# Patient Record
Sex: MALE | Race: WHITE | NOT HISPANIC OR LATINO | Employment: UNEMPLOYED | URBAN - METROPOLITAN AREA
[De-identification: names, ages, dates, MRNs, and addresses within clinical notes are randomized per-mention and may not be internally consistent; named-entity substitution may affect disease eponyms.]

---

## 2024-05-31 ENCOUNTER — HOSPITAL ENCOUNTER (EMERGENCY)
Facility: HOSPITAL | Age: 29
End: 2024-05-31
Attending: EMERGENCY MEDICINE

## 2024-05-31 ENCOUNTER — HOSPITAL ENCOUNTER (INPATIENT)
Facility: HOSPITAL | Age: 29
LOS: 7 days | Discharge: HOME | DRG: 751 | End: 2024-06-07
Attending: STUDENT IN AN ORGANIZED HEALTH CARE EDUCATION/TRAINING PROGRAM | Admitting: STUDENT IN AN ORGANIZED HEALTH CARE EDUCATION/TRAINING PROGRAM
Payer: COMMERCIAL

## 2024-05-31 VITALS
TEMPERATURE: 99 F | DIASTOLIC BLOOD PRESSURE: 76 MMHG | RESPIRATION RATE: 18 BRPM | WEIGHT: 210 LBS | HEIGHT: 75 IN | SYSTOLIC BLOOD PRESSURE: 128 MMHG | HEART RATE: 97 BPM | BODY MASS INDEX: 26.11 KG/M2 | OXYGEN SATURATION: 100 %

## 2024-05-31 DIAGNOSIS — F41.9 ANXIETY: Primary | ICD-10-CM

## 2024-05-31 DIAGNOSIS — F33.9 RECURRENT MAJOR DEPRESSIVE DISORDER (HCC): ICD-10-CM

## 2024-05-31 DIAGNOSIS — F10.10 ALCOHOL ABUSE: ICD-10-CM

## 2024-05-31 DIAGNOSIS — Z00.8 ENCOUNTER FOR PSYCHOLOGICAL EVALUATION: ICD-10-CM

## 2024-05-31 DIAGNOSIS — Z00.8 MEDICAL CLEARANCE FOR PSYCHIATRIC ADMISSION: ICD-10-CM

## 2024-05-31 DIAGNOSIS — F32.A DEPRESSION: ICD-10-CM

## 2024-05-31 DIAGNOSIS — R45.851 SUICIDAL IDEATION: Primary | ICD-10-CM

## 2024-05-31 LAB
AMPHETAMINES SERPL QL SCN: NEGATIVE
BARBITURATES UR QL: NEGATIVE
BENZODIAZ UR QL: NEGATIVE
COCAINE UR QL: NEGATIVE
ETHANOL EXG-MCNC: 0 MG/DL
FENTANYL UR QL SCN: NEGATIVE
HYDROCODONE UR QL SCN: NEGATIVE
METHADONE UR QL: NEGATIVE
OPIATES UR QL SCN: NEGATIVE
OXYCODONE+OXYMORPHONE UR QL SCN: NEGATIVE
PCP UR QL: NEGATIVE
THC UR QL: NEGATIVE

## 2024-05-31 PROCEDURE — 99285 EMERGENCY DEPT VISIT HI MDM: CPT | Performed by: EMERGENCY MEDICINE

## 2024-05-31 PROCEDURE — 80307 DRUG TEST PRSMV CHEM ANLYZR: CPT

## 2024-05-31 PROCEDURE — 82075 ASSAY OF BREATH ETHANOL: CPT

## 2024-05-31 PROCEDURE — 99283 EMERGENCY DEPT VISIT LOW MDM: CPT

## 2024-05-31 RX ORDER — PROPRANOLOL HYDROCHLORIDE 20 MG/1
10 TABLET ORAL EVERY 8 HOURS PRN
Status: CANCELLED | OUTPATIENT
Start: 2024-05-31

## 2024-05-31 RX ORDER — OLANZAPINE 10 MG/2ML
5 INJECTION, POWDER, FOR SOLUTION INTRAMUSCULAR
Status: CANCELLED | OUTPATIENT
Start: 2024-05-31

## 2024-05-31 RX ORDER — HYDROXYZINE HYDROCHLORIDE 25 MG/1
25 TABLET, FILM COATED ORAL
Status: DISCONTINUED | OUTPATIENT
Start: 2024-05-31 | End: 2024-06-07 | Stop reason: HOSPADM

## 2024-05-31 RX ORDER — MAGNESIUM HYDROXIDE/ALUMINUM HYDROXICE/SIMETHICONE 120; 1200; 1200 MG/30ML; MG/30ML; MG/30ML
30 SUSPENSION ORAL EVERY 4 HOURS PRN
Status: DISCONTINUED | OUTPATIENT
Start: 2024-05-31 | End: 2024-06-07 | Stop reason: HOSPADM

## 2024-05-31 RX ORDER — HYDROXYZINE HYDROCHLORIDE 25 MG/1
100 TABLET, FILM COATED ORAL
Status: CANCELLED | OUTPATIENT
Start: 2024-05-31

## 2024-05-31 RX ORDER — AMOXICILLIN 250 MG
1 CAPSULE ORAL DAILY PRN
Status: DISCONTINUED | OUTPATIENT
Start: 2024-05-31 | End: 2024-06-07 | Stop reason: HOSPADM

## 2024-05-31 RX ORDER — OLANZAPINE 2.5 MG/1
5 TABLET, FILM COATED ORAL
Status: CANCELLED | OUTPATIENT
Start: 2024-05-31

## 2024-05-31 RX ORDER — OLANZAPINE 10 MG/1
10 TABLET ORAL
Status: CANCELLED | OUTPATIENT
Start: 2024-05-31

## 2024-05-31 RX ORDER — LORAZEPAM 2 MG/ML
2 INJECTION INTRAMUSCULAR EVERY 6 HOURS PRN
Status: CANCELLED | OUTPATIENT
Start: 2024-05-31

## 2024-05-31 RX ORDER — MINERAL OIL AND PETROLATUM 150; 830 MG/G; MG/G
OINTMENT OPHTHALMIC 4 TIMES DAILY PRN
Status: DISCONTINUED | OUTPATIENT
Start: 2024-05-31 | End: 2024-06-07 | Stop reason: HOSPADM

## 2024-05-31 RX ORDER — PROPRANOLOL HYDROCHLORIDE 10 MG/1
10 TABLET ORAL EVERY 8 HOURS PRN
Status: DISCONTINUED | OUTPATIENT
Start: 2024-05-31 | End: 2024-06-07 | Stop reason: HOSPADM

## 2024-05-31 RX ORDER — ACETAMINOPHEN 325 MG/1
975 TABLET ORAL EVERY 6 HOURS PRN
Status: DISCONTINUED | OUTPATIENT
Start: 2024-05-31 | End: 2024-06-07 | Stop reason: HOSPADM

## 2024-05-31 RX ORDER — ACETAMINOPHEN 325 MG/1
650 TABLET ORAL EVERY 6 HOURS PRN
Status: CANCELLED | OUTPATIENT
Start: 2024-05-31

## 2024-05-31 RX ORDER — LORAZEPAM 1 MG/1
2 TABLET ORAL ONCE
Status: COMPLETED | OUTPATIENT
Start: 2024-05-31 | End: 2024-05-31

## 2024-05-31 RX ORDER — MAGNESIUM HYDROXIDE/ALUMINUM HYDROXICE/SIMETHICONE 120; 1200; 1200 MG/30ML; MG/30ML; MG/30ML
30 SUSPENSION ORAL EVERY 4 HOURS PRN
Status: CANCELLED | OUTPATIENT
Start: 2024-05-31

## 2024-05-31 RX ORDER — BENZTROPINE MESYLATE 1 MG/1
1 TABLET ORAL 2 TIMES DAILY PRN
Status: DISCONTINUED | OUTPATIENT
Start: 2024-05-31 | End: 2024-06-07 | Stop reason: HOSPADM

## 2024-05-31 RX ORDER — BENZTROPINE MESYLATE 1 MG/ML
1 INJECTION INTRAMUSCULAR; INTRAVENOUS 2 TIMES DAILY PRN
Status: DISCONTINUED | OUTPATIENT
Start: 2024-05-31 | End: 2024-06-07 | Stop reason: HOSPADM

## 2024-05-31 RX ORDER — ACETAMINOPHEN 325 MG/1
975 TABLET ORAL EVERY 6 HOURS PRN
Status: CANCELLED | OUTPATIENT
Start: 2024-05-31

## 2024-05-31 RX ORDER — BISACODYL 10 MG
10 SUPPOSITORY, RECTAL RECTAL DAILY PRN
Status: CANCELLED | OUTPATIENT
Start: 2024-05-31

## 2024-05-31 RX ORDER — DIPHENHYDRAMINE HYDROCHLORIDE 50 MG/ML
50 INJECTION INTRAMUSCULAR; INTRAVENOUS EVERY 6 HOURS PRN
Status: CANCELLED | OUTPATIENT
Start: 2024-05-31

## 2024-05-31 RX ORDER — OLANZAPINE 10 MG/2ML
10 INJECTION, POWDER, FOR SOLUTION INTRAMUSCULAR
Status: CANCELLED | OUTPATIENT
Start: 2024-05-31

## 2024-05-31 RX ORDER — OLANZAPINE 10 MG/1
10 TABLET ORAL
Status: DISCONTINUED | OUTPATIENT
Start: 2024-05-31 | End: 2024-06-07 | Stop reason: HOSPADM

## 2024-05-31 RX ORDER — TRAZODONE HYDROCHLORIDE 50 MG/1
50 TABLET ORAL
Status: DISCONTINUED | OUTPATIENT
Start: 2024-05-31 | End: 2024-06-07 | Stop reason: HOSPADM

## 2024-05-31 RX ORDER — OLANZAPINE 10 MG/2ML
10 INJECTION, POWDER, FOR SOLUTION INTRAMUSCULAR
Status: DISCONTINUED | OUTPATIENT
Start: 2024-05-31 | End: 2024-06-07 | Stop reason: HOSPADM

## 2024-05-31 RX ORDER — AMOXICILLIN 250 MG
1 CAPSULE ORAL DAILY PRN
Status: CANCELLED | OUTPATIENT
Start: 2024-05-31

## 2024-05-31 RX ORDER — MINERAL OIL AND PETROLATUM 150; 830 MG/G; MG/G
OINTMENT OPHTHALMIC 4 TIMES DAILY PRN
Status: CANCELLED | OUTPATIENT
Start: 2024-05-31

## 2024-05-31 RX ORDER — BISACODYL 10 MG
10 SUPPOSITORY, RECTAL RECTAL DAILY PRN
Status: DISCONTINUED | OUTPATIENT
Start: 2024-05-31 | End: 2024-06-07 | Stop reason: HOSPADM

## 2024-05-31 RX ORDER — OLANZAPINE 5 MG/1
5 TABLET ORAL
Status: DISCONTINUED | OUTPATIENT
Start: 2024-05-31 | End: 2024-06-07 | Stop reason: HOSPADM

## 2024-05-31 RX ORDER — OLANZAPINE 2.5 MG/1
2.5 TABLET, FILM COATED ORAL
Status: DISCONTINUED | OUTPATIENT
Start: 2024-05-31 | End: 2024-06-07 | Stop reason: HOSPADM

## 2024-05-31 RX ORDER — BENZTROPINE MESYLATE 1 MG/1
1 TABLET ORAL 2 TIMES DAILY PRN
Status: CANCELLED | OUTPATIENT
Start: 2024-05-31

## 2024-05-31 RX ORDER — HYDROXYZINE 50 MG/1
50 TABLET, FILM COATED ORAL
Status: DISCONTINUED | OUTPATIENT
Start: 2024-05-31 | End: 2024-06-07 | Stop reason: HOSPADM

## 2024-05-31 RX ORDER — HYDROXYZINE HYDROCHLORIDE 25 MG/1
25 TABLET, FILM COATED ORAL
Status: CANCELLED | OUTPATIENT
Start: 2024-05-31

## 2024-05-31 RX ORDER — OLANZAPINE 10 MG/2ML
5 INJECTION, POWDER, FOR SOLUTION INTRAMUSCULAR
Status: DISCONTINUED | OUTPATIENT
Start: 2024-05-31 | End: 2024-06-07 | Stop reason: HOSPADM

## 2024-05-31 RX ORDER — HYDROXYZINE HYDROCHLORIDE 25 MG/1
50 TABLET, FILM COATED ORAL
Status: CANCELLED | OUTPATIENT
Start: 2024-05-31

## 2024-05-31 RX ORDER — OLANZAPINE 2.5 MG/1
2.5 TABLET, FILM COATED ORAL
Status: CANCELLED | OUTPATIENT
Start: 2024-05-31

## 2024-05-31 RX ORDER — BENZTROPINE MESYLATE 1 MG/ML
1 INJECTION INTRAMUSCULAR; INTRAVENOUS 2 TIMES DAILY PRN
Status: CANCELLED | OUTPATIENT
Start: 2024-05-31

## 2024-05-31 RX ORDER — HYDROXYZINE 50 MG/1
100 TABLET, FILM COATED ORAL
Status: DISCONTINUED | OUTPATIENT
Start: 2024-05-31 | End: 2024-06-07 | Stop reason: HOSPADM

## 2024-05-31 RX ORDER — ACETAMINOPHEN 325 MG/1
650 TABLET ORAL EVERY 6 HOURS PRN
Status: DISCONTINUED | OUTPATIENT
Start: 2024-05-31 | End: 2024-06-07 | Stop reason: HOSPADM

## 2024-05-31 RX ORDER — LORAZEPAM 2 MG/ML
2 INJECTION INTRAMUSCULAR EVERY 6 HOURS PRN
Status: DISCONTINUED | OUTPATIENT
Start: 2024-05-31 | End: 2024-06-07 | Stop reason: HOSPADM

## 2024-05-31 RX ORDER — ACETAMINOPHEN 325 MG/1
650 TABLET ORAL EVERY 4 HOURS PRN
Status: CANCELLED | OUTPATIENT
Start: 2024-05-31

## 2024-05-31 RX ORDER — TRAZODONE HYDROCHLORIDE 50 MG/1
50 TABLET ORAL
Status: CANCELLED | OUTPATIENT
Start: 2024-05-31

## 2024-05-31 RX ORDER — ACETAMINOPHEN 325 MG/1
650 TABLET ORAL EVERY 4 HOURS PRN
Status: DISCONTINUED | OUTPATIENT
Start: 2024-05-31 | End: 2024-06-07 | Stop reason: HOSPADM

## 2024-05-31 RX ORDER — DIPHENHYDRAMINE HYDROCHLORIDE 50 MG/ML
50 INJECTION INTRAMUSCULAR; INTRAVENOUS EVERY 6 HOURS PRN
Status: DISCONTINUED | OUTPATIENT
Start: 2024-05-31 | End: 2024-06-07 | Stop reason: HOSPADM

## 2024-05-31 RX ADMIN — LORAZEPAM 2 MG: 1 TABLET ORAL at 18:26

## 2024-05-31 RX ADMIN — LORAZEPAM 2 MG: 1 TABLET ORAL at 14:09

## 2024-05-31 RX ADMIN — HYDROXYZINE HYDROCHLORIDE 50 MG: 50 TABLET, FILM COATED ORAL at 22:09

## 2024-05-31 NOTE — ED NOTES
Patient is accepted at [facility]  Patient is accepted by PETE Amaya/Dr. Mosher per Stefany, Intake.     Time TBD. Waiting on Intake to advise.       Nurse report is to be called to [phone] prior to patient transfer.

## 2024-05-31 NOTE — ED PROVIDER NOTES
"History  Chief Complaint   Patient presents with    Psychiatric Evaluation     PT \"I may have made the comment I was going to jump off a bridge to one of the staff members. I wouldn't do it b/c of my family and my fernandez. Though I am not sure about my fernandez\" PT denies HI auditory or visual hallucinations      This is a 28-year-old male brought in by EMS.  As per the patient he states he made a threat to jump off a bridge because he was very upset while talking to a  from the rehab center he was supposed to go to.  He states he had multiple stressors and has difficulty dealing with the stressors.  He denies chest pain or trouble breathing.  He denies nausea or vomiting.        None       Past Medical History:   Diagnosis Date    Anxiety     Depression        No past surgical history on file.    No family history on file.  I have reviewed and agree with the history as documented.    E-Cigarette/Vaping    E-Cigarette Use Never User      E-Cigarette/Vaping Substances     Social History     Tobacco Use    Smoking status: Never   Vaping Use    Vaping status: Never Used   Substance Use Topics    Alcohol use: Not Currently    Drug use: Not Currently       Review of Systems   All other systems reviewed and are negative.      Physical Exam  Physical Exam  Constitutional: Vital signs reviewed, patient well-appearing, nontoxic  Eyes: Extraocular movements intact   HEENT: Trachea midline, no JVD, moist mucous membranes   Respiratory: Equal chest expansion   Cardiovascular: Well perfused   Abdomen: No distension   Extremities: No edema   Neuro: awake, alert, oriented, no focal weakness   Skin: warm, dry, intact, no rashes noted         Vital Signs  ED Triage Vitals [05/31/24 1220]   Temperature Pulse Respirations Blood Pressure SpO2   99 °F (37.2 °C) (!) 108 18 130/82 100 %      Temp Source Heart Rate Source Patient Position - Orthostatic VS BP Location FiO2 (%)   Oral Monitor Sitting Right arm --      Pain Score     "   No Pain           Vitals:    05/31/24 1220 05/31/24 1448   BP: 130/82 128/76   Pulse: (!) 108 97   Patient Position - Orthostatic VS: Sitting          Visual Acuity  Visual Acuity      Flowsheet Row Most Recent Value   L Pupil Size (mm) 3   R Pupil Size (mm) 3            ED Medications  Medications   LORazepam (ATIVAN) tablet 2 mg (2 mg Oral Given 5/31/24 1409)       Diagnostic Studies  Results Reviewed       Procedure Component Value Units Date/Time    Rapid drug screen, urine [[id number]]  (Normal) Collected: 05/31/24 1238    Lab Status: Final result Specimen: Urine, Catheter Updated: 05/31/24 1301     Amph/Meth UR Negative     Barbiturate Ur Negative     Benzodiazepine Urine Negative     Cocaine Urine Negative     Methadone Urine Negative     Opiate Urine Negative     PCP Ur Negative     THC Urine Negative     Oxycodone Urine Negative     Fentanyl Urine Negative     HYDROCODONE URINE Negative    Narrative:      FOR MEDICAL PURPOSES ONLY.   IF CONFIRMATION NEEDED PLEASE CONTACT THE LAB WITHIN 5 DAYS.    Drug Screen Cutoff Levels:  AMPHETAMINE/METHAMPHETAMINES  1000 ng/mL  BARBITURATES     200 ng/mL  BENZODIAZEPINES     200 ng/mL  COCAINE      300 ng/mL  METHADONE      300 ng/mL  OPIATES      300 ng/mL  PHENCYCLIDINE     25 ng/mL  THC       50 ng/mL  OXYCODONE      100 ng/mL  FENTANYL      5 ng/mL  HYDROCODONE     300 ng/mL    POCT alcohol breath test [[id number]]  (Normal) Resulted: 05/31/24 1225    Lab Status: Final result Updated: 05/31/24 1225     EXTBreath Alcohol 0.000                   No orders to display              Procedures  Procedures         ED Course  ED Course as of 05/31/24 1545   Fri May 31, 2024   1253 The patient had an alcohol test of 0.  He is willing to sign a 201.  The patient is medically cleared for crisis and psychiatric evaluation   1545 The patient was accepted to [facility] for further care and evaluation.                                SBIRT 20yo+      Flowsheet Row Most Recent Value    Initial Alcohol Screen: US AUDIT-C     1. How often do you have a drink containing alcohol? 0 Filed at: 05/31/2024 1223   2. How many drinks containing alcohol do you have on a typical day you are drinking?  0 Filed at: 05/31/2024 1223   3a. Male UNDER 65: How often do you have five or more drinks on one occasion? 0 Filed at: 05/31/2024 1223   Audit-C Score 0 Filed at: 05/31/2024 1223   FELIPE: How many times in the past year have you...    Used an illegal drug or used a prescription medication for non-medical reasons? Never Filed at: 05/31/2024 1223                      Medical Decision Making  This is a 28-year-old male who presents to the emergency department with suicidal ideation.  I considered suicidal ideation, depression, anxiety.  These and other diagnoses were considered.     I did speak with the  from the rehab facility.  He does state that the patient made a threat to jump off a bridge.    Risk  Prescription drug management.  Decision regarding hospitalization.             Disposition  Final diagnoses:   Suicidal ideation   Depression   Encounter for psychological evaluation     Time reflects when diagnosis was documented in both MDM as applicable and the Disposition within this note       Time User Action Codes Description Comment    5/31/2024 12:53 PM Arley Newton Add [R45.851] Suicidal ideation     5/31/2024 12:53 PM Arley Newton Add [F32.A] Depression     5/31/2024 12:53 PM Arley Newton Add [Z00.8] Encounter for psychological evaluation     5/31/2024  2:19 PM Ponce Amaya Add [Z00.8] Medical clearance for psychiatric admission     5/31/2024  2:19 PM Ponce Amaya [F10.10] Alcohol abuse           ED Disposition       ED Disposition   Transfer to Behavioral Health Condition   --    Date/Time   Fri May 31, 2024 1253    Comment   Simba Huang should be transferred out pending crisis evaluation and has been medically cleared.               MD Documentation       Flowsheet Row Most Recent Value   Patient Condition The patient has been stabilized such that within reasonable medical probability, no material deterioration of the patient condition or the condition of the unborn child(tavia) is likely to result from the transfer   Reason for Transfer Level of Care needed not available at this facility   Benefits of Transfer Specialized equipment and/or services available at the receiving facility (Include comment)________________________   Risks of Transfer Potential for delay in receiving treatment, Potential deterioration of medical condition, Increased discomfort during transfer, Possible worsening of condition or death during transfer   Accepting Physician Vidhi   Accepting Facility Name, [facility]    (Name & Tel number) [facility]   Sending MD Newton   Provider Certification General risk, such as traffic hazards, adverse weather conditions, rough terrain or turbulence, possible failure of equipment (including vehicle or aircraft), or consequences of actions of persons outside the control of the transport personnel, Unanticipated needs of medical equipment and personnel during transport, The possibility of a transport vehicle being unavailable, Risk of worsening condition          RN Documentation      Flowsheet Row Most Recent Value   Accepting Facility Name, [facility]    (Name & Tel number) PAC          Follow-up Information    None         Patient's Medications    No medications on file       No discharge procedures on file.    PDMP Review       None            ED Provider  Electronically Signed by             Arley Newton DO  05/31/24 1985

## 2024-05-31 NOTE — ED NOTES
"Patient requested a pen and a paper to write down phone numbers. Patient in addition requested to receive antianxiety medication, \" I'm starting to get worked up in my head, and my anxiety is getting worse \". Provider was made aware.      Jorden Hammer RN  05/31/24 1817    "

## 2024-05-31 NOTE — ED NOTES
CTS@[id number].    Intake updated.     Nurse report is to be called to [phone] prior to patient transfer.

## 2024-05-31 NOTE — ED NOTES
"Patient states he took klonopin 2 mg this morning ( 8 am ) before being released from his cell. \" They gave it to me for alcohol withdraw\"     Jorden Hammer, RN  05/31/24 2013    "

## 2024-05-31 NOTE — EMTALA/ACUTE CARE TRANSFER
[facility] EMERGENCY DEPARTMENT  [address] [place] [id number]  Dept: [phone]      EMTALA TRANSFER CONSENT    NAME Simba Huang                                         1995                              MRN [id number]    I have been informed of my rights regarding examination, treatment, and transfer   by Dr. Arley Newton DO    Benefits: Specialized equipment and/or services available at the receiving facility (Include comment)________________________    Risks: Potential for delay in receiving treatment, Potential deterioration of medical condition, Increased discomfort during transfer, Possible worsening of condition or death during transfer      Consent for Transfer:  I acknowledge that my medical condition has been evaluated and explained to me by the emergency department physician or other qualified medical person and/or my attending physician, who has recommended that I be transferred to the service of  Accepting Physician: Vidhi at Accepting Facility Name, City & State : [place]. The above potential benefits of such transfer, the potential risks associated with such transfer, and the probable risks of not being transferred have been explained to me, and I fully understand them.  The doctor has explained that, in my case, the benefits of transfer outweigh the risks.  I agree to be transferred.    I authorize the performance of emergency medical procedures and treatments upon me in both transit and upon arrival at the receiving facility.  Additionally, I authorize the release of any and all medical records to the receiving facility and request they be transported with me, if possible.  I understand that the safest mode of transportation during a medical emergency is an ambulance and that the Hospital advocates the use of this mode of transport. Risks of traveling to the receiving facility by car, including absence of medical control, life sustaining equipment,  such as oxygen, and medical personnel has been explained to me and I fully understand them.    (ZANE CORRECT BOX BELOW)  [  ]  I consent to the stated transfer and to be transported by ambulance/helicopter.  [  ]  I consent to the stated transfer, but refuse transportation by ambulance and accept full responsibility for my transportation by car.  I understand the risks of non-ambulance transfers and I exonerate the Hospital and its staff from any deterioration in my condition that results from this refusal.    X___________________________________________    DATE  24  TIME________  Signature of patient or legally responsible individual signing on patient behalf           RELATIONSHIP TO PATIENT_________________________          Provider Certification    NAME Simba Huang                                         1995                              MRN [id number]    A medical screening exam was performed on the above named patient.  Based on the examination:    Condition Necessitating Transfer The primary encounter diagnosis was Suicidal ideation. Diagnoses of Depression, Encounter for psychological evaluation, Medical clearance for psychiatric admission, and Alcohol abuse were also pertinent to this visit.    Patient Condition: The patient has been stabilized such that within reasonable medical probability, no material deterioration of the patient condition or the condition of the unborn child(tavia) is likely to result from the transfer    Reason for Transfer: Level of Care needed not available at this facility    Transfer Requirements: Facility [facility]   Space available and qualified personnel available for treatment as acknowledged by PAC  Agreed to accept transfer and to provide appropriate medical treatment as acknowledged by       Vidhi  Appropriate medical records of the examination and treatment of the patient are provided at the time of transfer   STAFF INITIAL WHEN COMPLETED  _______  Transfer will be performed by qualified personnel from    and appropriate transfer equipment as required, including the use of necessary and appropriate life support measures.    Provider Certification: I have examined the patient and explained the following risks and benefits of being transferred/refusing transfer to the patient/family:  General risk, such as traffic hazards, adverse weather conditions, rough terrain or turbulence, possible failure of equipment (including vehicle or aircraft), or consequences of actions of persons outside the control of the transport personnel, Unanticipated needs of medical equipment and personnel during transport, The possibility of a transport vehicle being unavailable, Risk of worsening condition      Based on these reasonable risks and benefits to the patient and/or the unborn child(tavia), and based upon the information available at the time of the patient’s examination, I certify that the medical benefits reasonably to be expected from the provision of appropriate medical treatments at another medical facility outweigh the increasing risks, if any, to the individual’s medical condition, and in the case of labor to the unborn child, from effecting the transfer.    X____________________________________________ DATE 05/31/24        TIME_______      ORIGINAL - SEND TO MEDICAL RECORDS   COPY - SEND WITH PATIENT DURING TRANSFER
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
## 2024-06-01 PROBLEM — F41.9 ANXIETY: Status: ACTIVE | Noted: 2024-06-01

## 2024-06-01 PROBLEM — F39 MOOD DISORDER (HCC): Status: ACTIVE | Noted: 2024-06-01

## 2024-06-01 PROBLEM — Z00.8 MEDICAL CLEARANCE FOR PSYCHIATRIC ADMISSION: Status: ACTIVE | Noted: 2024-06-01

## 2024-06-01 PROBLEM — F33.9 RECURRENT MAJOR DEPRESSIVE DISORDER (HCC): Status: ACTIVE | Noted: 2024-06-01

## 2024-06-01 LAB
25(OH)D3 SERPL-MCNC: 60.5 NG/ML (ref 30–100)
ALBUMIN SERPL BCP-MCNC: 4.2 G/DL (ref 3.5–5)
ALP SERPL-CCNC: 49 U/L (ref 34–104)
ALT SERPL W P-5'-P-CCNC: 19 U/L (ref 7–52)
ANION GAP SERPL CALCULATED.3IONS-SCNC: 7 MMOL/L (ref 4–13)
AST SERPL W P-5'-P-CCNC: 19 U/L (ref 13–39)
BASOPHILS # BLD AUTO: 0.04 THOUSANDS/ÂΜL (ref 0–0.1)
BASOPHILS NFR BLD AUTO: 1 % (ref 0–1)
BILIRUB SERPL-MCNC: 0.59 MG/DL (ref 0.2–1)
BUN SERPL-MCNC: 12 MG/DL (ref 5–25)
CALCIUM SERPL-MCNC: 8.8 MG/DL (ref 8.4–10.2)
CHLORIDE SERPL-SCNC: 104 MMOL/L (ref 96–108)
CHOLEST SERPL-MCNC: 216 MG/DL
CO2 SERPL-SCNC: 28 MMOL/L (ref 21–32)
CREAT SERPL-MCNC: 0.9 MG/DL (ref 0.6–1.3)
EOSINOPHIL # BLD AUTO: 0.18 THOUSAND/ÂΜL (ref 0–0.61)
EOSINOPHIL NFR BLD AUTO: 3 % (ref 0–6)
ERYTHROCYTE [DISTWIDTH] IN BLOOD BY AUTOMATED COUNT: 13.5 % (ref 11.6–15.1)
EST. AVERAGE GLUCOSE BLD GHB EST-MCNC: 100 MG/DL
GFR SERPL CREATININE-BSD FRML MDRD: 115 ML/MIN/1.73SQ M
GLUCOSE P FAST SERPL-MCNC: 86 MG/DL (ref 65–99)
GLUCOSE SERPL-MCNC: 86 MG/DL (ref 65–140)
HBA1C MFR BLD: 5.1 %
HCT VFR BLD AUTO: 47.1 % (ref 36.5–49.3)
HDLC SERPL-MCNC: 79 MG/DL
HGB BLD-MCNC: 15.1 G/DL (ref 12–17)
IMM GRANULOCYTES # BLD AUTO: 0 THOUSAND/UL (ref 0–0.2)
IMM GRANULOCYTES NFR BLD AUTO: 0 % (ref 0–2)
LDLC SERPL CALC-MCNC: 124 MG/DL (ref 0–100)
LYMPHOCYTES # BLD AUTO: 2.27 THOUSANDS/ÂΜL (ref 0.6–4.47)
LYMPHOCYTES NFR BLD AUTO: 41 % (ref 14–44)
MCH RBC QN AUTO: 29 PG (ref 26.8–34.3)
MCHC RBC AUTO-ENTMCNC: 32.1 G/DL (ref 31.4–37.4)
MCV RBC AUTO: 90 FL (ref 82–98)
MONOCYTES # BLD AUTO: 0.51 THOUSAND/ÂΜL (ref 0.17–1.22)
MONOCYTES NFR BLD AUTO: 9 % (ref 4–12)
NEUTROPHILS # BLD AUTO: 2.5 THOUSANDS/ÂΜL (ref 1.85–7.62)
NEUTS SEG NFR BLD AUTO: 46 % (ref 43–75)
NONHDLC SERPL-MCNC: 137 MG/DL
NRBC BLD AUTO-RTO: 0 /100 WBCS
PLATELET # BLD AUTO: 233 THOUSANDS/UL (ref 149–390)
PMV BLD AUTO: 10 FL (ref 8.9–12.7)
POTASSIUM SERPL-SCNC: 3.7 MMOL/L (ref 3.5–5.3)
PROT SERPL-MCNC: 7 G/DL (ref 6.4–8.4)
RBC # BLD AUTO: 5.21 MILLION/UL (ref 3.88–5.62)
SODIUM SERPL-SCNC: 139 MMOL/L (ref 135–147)
TRIGL SERPL-MCNC: 64 MG/DL
TSH SERPL DL<=0.05 MIU/L-ACNC: 2.7 UIU/ML (ref 0.45–4.5)
WBC # BLD AUTO: 5.5 THOUSAND/UL (ref 4.31–10.16)

## 2024-06-01 PROCEDURE — 84443 ASSAY THYROID STIM HORMONE: CPT | Performed by: PHYSICIAN ASSISTANT

## 2024-06-01 PROCEDURE — 82306 VITAMIN D 25 HYDROXY: CPT | Performed by: PHYSICIAN ASSISTANT

## 2024-06-01 PROCEDURE — 80053 COMPREHEN METABOLIC PANEL: CPT | Performed by: PHYSICIAN ASSISTANT

## 2024-06-01 PROCEDURE — 86780 TREPONEMA PALLIDUM: CPT | Performed by: PHYSICIAN ASSISTANT

## 2024-06-01 PROCEDURE — 93005 ELECTROCARDIOGRAM TRACING: CPT

## 2024-06-01 PROCEDURE — 85025 COMPLETE CBC W/AUTO DIFF WBC: CPT | Performed by: PHYSICIAN ASSISTANT

## 2024-06-01 PROCEDURE — 99223 1ST HOSP IP/OBS HIGH 75: CPT | Performed by: STUDENT IN AN ORGANIZED HEALTH CARE EDUCATION/TRAINING PROGRAM

## 2024-06-01 PROCEDURE — 80061 LIPID PANEL: CPT | Performed by: PHYSICIAN ASSISTANT

## 2024-06-01 PROCEDURE — 83036 HEMOGLOBIN GLYCOSYLATED A1C: CPT | Performed by: PHYSICIAN ASSISTANT

## 2024-06-01 PROCEDURE — 99253 IP/OBS CNSLTJ NEW/EST LOW 45: CPT

## 2024-06-01 RX ORDER — FOLIC ACID 1 MG/1
1 TABLET ORAL DAILY
Status: DISCONTINUED | OUTPATIENT
Start: 2024-06-01 | End: 2024-06-07 | Stop reason: HOSPADM

## 2024-06-01 RX ORDER — LANOLIN ALCOHOL/MO/W.PET/CERES
100 CREAM (GRAM) TOPICAL DAILY
Status: DISCONTINUED | OUTPATIENT
Start: 2024-06-01 | End: 2024-06-07 | Stop reason: HOSPADM

## 2024-06-01 RX ORDER — MIRTAZAPINE 15 MG/1
15 TABLET, FILM COATED ORAL
Status: DISCONTINUED | OUTPATIENT
Start: 2024-06-01 | End: 2024-06-04

## 2024-06-01 RX ADMIN — MULTIPLE VITAMINS W/ MINERALS TAB 1 TABLET: TAB ORAL at 13:37

## 2024-06-01 RX ADMIN — HYDROXYZINE HYDROCHLORIDE 100 MG: 50 TABLET, FILM COATED ORAL at 14:52

## 2024-06-01 RX ADMIN — MIRTAZAPINE 15 MG: 15 TABLET, FILM COATED ORAL at 21:20

## 2024-06-01 RX ADMIN — HYDROXYZINE HYDROCHLORIDE 100 MG: 50 TABLET, FILM COATED ORAL at 08:55

## 2024-06-01 RX ADMIN — Medication 100 MG: at 13:37

## 2024-06-01 RX ADMIN — FOLIC ACID 1 MG: 1 TABLET ORAL at 13:37
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
## 2024-06-01 NOTE — CONSULTS
[facility]  Consult  Name: Simba Huang 28 y.o. male I MRN: [id number]  Unit/Bed#: U 214-02 I Date of Admission: 5/31/2024   Date of Service: 6/1/2024 I Hospital Day: 1    Inpatient consult for Medical Clearance for  patient  Consult performed by: Torrie Mora PA-C  Consult ordered by: Ponce Amaya PA-C          Assessment & Plan   Medical clearance for psychiatric admission  Assessment & Plan  Admission labs reviewed, CBC, CMP, lipid panel, TSH acceptable  RPR, Vit D, HbA1C pending  Vitals stable   UDS negative  EKG reveals NSR,   Medically stable for continued inpatient psychiatric treatment based on available results    Alcohol abuse  Assessment & Plan  Hx of ETOH use (daily, unable to specify amount) over the past few months   Last drink > 1 week ago, currently out of withdrawal window  No hx of withdrawal/seizures  Encourage cessation   Vitamin supplementation              Counseling / Coordination of Care Time: 30 minutes.  Greater than 50% of total time spent on patient counseling and coordination of care.    Collaboration of Care: Were Recommendations Directly Discussed with Primary Treatment Team? - No     History of Present Illness:    Simba Huang is a 28 y.o. male with PMH of ETOH use who is originally admitted to the psychiatry service due to expression of SI. We are consulted for medical clearance for admission to Behavioral Health Unit and treatment of underlying psychiatric illness.      Discussed patient's medical history, he denies any known medical conditions or prior diagnoses, previously on Zoloft however no longer taking.  He endorses history of frequent alcohol use, he notes often daily over the past couple months however last drink was greater than 1 week ago.  He denies any history of withdrawal or seizures in the past.  He denies additional substance use.  Currently patient denies any physical symptoms including fevers or  chills, chest pain, cough, shortness of breath, nausea/vomiting/diarrhea/abdominal pain, urinary changes, rashes/wounds or any additional physical complaints at this time.  Labs and vital stable, based on all available data patient peers medically stable to continue inpatient psychiatric treatment.    Review of Systems:    Review of Systems   Constitutional:  Negative for chills, fatigue and fever.   HENT:  Negative for congestion, rhinorrhea and sore throat.    Eyes:  Negative for visual disturbance.   Respiratory:  Negative for cough, chest tightness, shortness of breath and wheezing.    Cardiovascular:  Negative for chest pain, palpitations and leg swelling.   Gastrointestinal:  Negative for abdominal pain, constipation, diarrhea, nausea and vomiting.   Genitourinary:  Negative for difficulty urinating, dysuria and frequency.   Musculoskeletal:  Negative for arthralgias and myalgias.   Skin:  Negative for rash and wound.   Neurological:  Negative for dizziness, light-headedness and headaches.   Psychiatric/Behavioral:  Positive for dysphoric mood and suicidal ideas.    All other systems reviewed and are negative.      Past Medical and Surgical History:     Past Medical History:   Diagnosis Date    Anxiety     Depression        No past surgical history on file.    Meds/Allergies:    all medications and allergies reviewed    Allergies: No Known Allergies    Social History:     Marital Status: Single    Substance Use History:   Social History     Substance and Sexual Activity   Alcohol Use Not Currently     Social History     Tobacco Use   Smoking Status Never    Passive exposure: Never   Smokeless Tobacco Never   Tobacco Comments    N/A, non-smoker.     Social History     Substance and Sexual Activity   Drug Use Not Currently       Family History:    non-contributory    Physical Exam:     Vitals:   Blood Pressure: 125/87 (06/01/24 0816)  Pulse: 76 (06/01/24 0816)  Temperature: 97.8 °F (36.6 °C) (06/01/24  "0816)  Temp Source: Tympanic (06/01/24 0816)  Respirations: 18 (06/01/24 0816)  Height: 6' 3\" (190.5 cm) (05/31/24 2035)  Weight - Scale: 92.7 kg (204 lb 6.4 oz) (05/31/24 2035)  SpO2: 98 % (05/31/24 2035)    Physical Exam  Vitals and nursing note reviewed.   Constitutional:       General: He is not in acute distress.     Appearance: He is not ill-appearing.   HENT:      Head: Normocephalic and atraumatic.   Eyes:      General:         Right eye: No discharge.         Left eye: No discharge.      Extraocular Movements: Extraocular movements intact.      Conjunctiva/sclera: Conjunctivae normal.   Cardiovascular:      Rate and Rhythm: Normal rate and regular rhythm.      Heart sounds: Normal heart sounds. No murmur heard.  Pulmonary:      Effort: Pulmonary effort is normal. No respiratory distress.      Breath sounds: Normal breath sounds. No wheezing, rhonchi or rales.   Abdominal:      General: Bowel sounds are normal. There is no distension.      Palpations: Abdomen is soft.      Tenderness: There is no abdominal tenderness. There is no guarding.   Musculoskeletal:      Right lower leg: No edema.      Left lower leg: No edema.   Skin:     General: Skin is warm and dry.   Neurological:      General: No focal deficit present.      Mental Status: He is alert and oriented to person, place, and time. Mental status is at baseline.      Cranial Nerves: No cranial nerve deficit.   Psychiatric:         Mood and Affect: Mood normal.         Behavior: Behavior normal.         Additional Data:     Lab Results: I have personally reviewed pertinent reports.      Results from last 7 days   Lab Units 06/01/24  0610   WBC Thousand/uL 5.50   HEMOGLOBIN g/dL 15.1   HEMATOCRIT % 47.1   PLATELETS Thousands/uL 233   SEGS PCT % 46   LYMPHO PCT % 41   MONO PCT % 9   EOS PCT % 3     Results from last 7 days   Lab Units 06/01/24  0609   SODIUM mmol/L 139   POTASSIUM mmol/L 3.7   CHLORIDE mmol/L 104   CO2 mmol/L 28   BUN mg/dL 12 " "  CREATININE mg/dL 0.90   ANION GAP mmol/L 7   CALCIUM mg/dL 8.8   ALBUMIN g/dL 4.2   TOTAL BILIRUBIN mg/dL 0.59   ALK PHOS U/L 49   ALT U/L 19   AST U/L 19   GLUCOSE RANDOM mg/dL 86             No results found for: \"HGBA1C\"        EKG, Pathology, and Other Studies Reviewed on Admission:   EKG pending     ** Please Note: This note has been constructed using a voice recognition system. **    "

## 2024-06-01 NOTE — ASSESSMENT & PLAN NOTE
Admission labs reviewed, CBC, CMP, lipid panel, TSH acceptable  RPR, Vit D, HbA1C pending  Vitals stable   UDS negative  EKG reveals NSR,   Medically stable for continued inpatient psychiatric treatment based on available results

## 2024-06-01 NOTE — NURSING NOTE
Pt social with peers. Talking about [facility] at times. Pt educated and provided handouts on Atarax and Remeron. Denies SI/HI or hallucination. Able to express needs. Having fluctuating anxiety. Educated on coping skills.    40 mg in sodium chloride (PF) 0.9 % 10 mL injection (has no administration in time range)   ketorolac (TORADOL) injection 15 mg (15 mg IntraVENous Given 9/16/23 1537)   morphine (PF) injection 4 mg (4 mg IntraVENous Given 9/16/23 1650)   iopamidol (ISOVUE-370) 76 % injection 75 mL (75 mLs IntraVENous Given 9/16/23 1642)   piperacillin-tazobactam (ZOSYN) 4,500 mg in sodium chloride 0.9 % 100 mL IVPB (mini-bag) (0 mg IntraVENous Stopped 9/16/23 2005)             Patient was nontoxic, well appearing, with normal vital signs with the exception of hypertension 170/104. Presents for lower substernal chest pain with epigastric abdominal pain. She is 9 weeks post partum. On exam, abdomen is soft nontender. Will obtain labs, EKG, CXR. Records reviewed:  -Reviewed OB discharge summary from admission 7/13/23-7/16/23 - was admitted for preeclampsia with severe features, gestational diabetes, IgA nephropathy, PCOS, IgA mediated leukocytoclastic vasculitis. Has repeat LTCS for preeclampsia with severe features at 37w5d. Had uncomplicated post partum course. Discharged on labetalol. Differential diagnosis includes PE, cholecystitis, pancreatitis, ACS, other    Labs show no leukocytosis or anemia. Trop not elevated. Ddimer 2.69. LFTs are elevated with alk phose 132, , . Normal total bili. Lipase is greater than 3,000. Will obtain imaging. Upon reeval, pain persisted. Was given morphine IV     CT chest for PE negative. CT a/p shows acute pancreatitis with reactive duodenal and gallbladder wall thickening. With CT findings, elevated lipase, and elevated LFTs, suspect gallstone pancreatitis. Zosyn ordered as has gallbladder wall thickening with suspect stone. Medicine consulted. Spoke with Dr. Jdae Baer who would like me to verify that OB is agreeable to Zosyn since patient is breastfeeding. I spoke with Dr. Saran Hernández OB who reports Zosyn is fine.   I also consulted General Surgery and spoke with Dr. Yolanda Soliz. who plans for likely US on Monday. I also consulted GI and spoke with Dr. Donaldo Cortes who reports Dr. Royal Briceño will consult on patient    Upon reeval, she is sitting in stretcher in no distress. Appears comfortable. Chronic Conditions:       I am the Primary Clinician of Record. Is this patient to be included in the SEP-1 Core Measure due to severe sepsis or septic shock? No   Exclusion criteria - the patient is NOT to be included for SEP-1 Core Measure due to:  2+ SIRS criteria are not met    PROCEDURES   Unless otherwise noted below, none     Procedures    FINAL IMPRESSION      1. Acute pancreatitis, unspecified complication status, unspecified pancreatitis type    2. Thickening of wall of gallbladder    3. Elevated liver enzymes    4. Elevated blood pressure reading          DISPOSITION/PLAN       DISPOSITION Admitted 09/16/2023 18:73:73 PM      PATIENT REFERRED TO:  No follow-up provider specified.     DISCHARGE MEDICATIONS:  Current Discharge Medication List          DISCONTINUED MEDICATIONS:  Current Discharge Medication List                 (Please note that portions of this note were completed with a voice recognition program.  Efforts were made to edit the dictations but occasionally words are mis-transcribed.)    MARIO Grijalva (electronically signed)            MARIO Grijalva  09/16/23 2329

## 2024-06-01 NOTE — TREATMENT PLAN
TREATMENT PLAN REVIEW - Behavioral Health Simba Huang 28 y.o. 1995 male MRN: [id number]    [facility] QU IP BEHAVIORAL [facility] Room / Bed: [facility] 214/[facility] 214-02 Encounter: [id number]          Admit Date/Time:  5/31/2024  8:21 PM    Treatment Team:   MD Andria Krause, XANDER Anderson, XANDER Love    Diagnosis: Principal Problem:    Recurrent major depressive disorder (HCC)  Active Problems:    Alcohol abuse    Medical clearance for psychiatric admission    Anxiety    Mood disorder (HCC)      Patient Strengths/Assets: cooperative, motivation for treatment/growth, patient is on a voluntary commitment    Patient Barriers/Limitations: limited support system, marital/family conflict, substance abuse    Short Term Goals: decrease in depressive symptoms, decrease in anxiety symptoms, decrease in suicidal thoughts, decrease in self abusive behaviors, improvement in insight, improvement in self care, sleep improvement, improvement in appetite, mood stabilization    Long Term Goals: improvement in depression, improvement in anxiety, resolution of depressive symptoms, stabilization of mood, free of suicidal thoughts, improved insight, acceptance of need for psychiatric medications, acceptance of need for psychiatric treatment, adequate self care, adequate sleep, adequate appetite    Progress Towards Goals: starting psychiatric medications as prescribed, improving gradually, attends groups, mood is stabilizing gradually, less anxious, less depressed    Recommended Treatment: medication management, patient medication education, group therapy, milieu therapy, continued Behavioral Health psychiatric evaluation/assessment process    Treatment Frequency: daily medication monitoring, group and milieu therapy daily, monitoring through interdisciplinary rounds, monitoring through weekly patient  care conferences    Expected Discharge Date:  5-7 days    Discharge Plan: placement in inpatient drug and alcohol rehabilitation program, referral for outpatient medication management with a psychiatrist, referral for outpatient psychotherapy    Treatment Plan Created/Updated By: Kaila Mosher MD

## 2024-06-01 NOTE — ED NOTES
This nurse accessed chart as I received a call from a woman that asked for an update on this pt. Woman asked nicely, if she could first explain the situation which would explain her relationship to pt. I agreed to hear her out. The caller stated that they, her daughter and her, where told that this pt was on a 72hr hold. That he was on a hold because he broke into her daughters apartment and reportedly stated he was going to hurt himself. Police called and pt take to [facility]. The reason the woman called is because her daughter is a wrack as the pt reportedly called her all night and into this morning from many different numbers and also left voicemails. The woman asked if there was anything I could do or if she could be given a number to where he is so she could call there and ask them to make him stop calling till they could obtain a PFA. I explained that I understood the worry and upset ment but I could not give out his information but I would work on things on the back side if possible. Caller was happy with this response call was ended.     I was able to speak to Debi at [facility] and told her about the call I received about the pts reported behavior and their concerns. Debi thanked me for the call and said she would pass it along to their CC.       Idalia Tejada RN  06/01/24 1300       Idalia Tejada RN  06/01/24 1301

## 2024-06-01 NOTE — NURSING NOTE
"At 0855 amy requested medication for increased anxiety. Mann 25. Atarax 100mg PO given at that time. On reassessment he reports it was \"very helpful. Helped me get out of my head.\"   "

## 2024-06-01 NOTE — TREATMENT TEAM
06/01/24 1000   Team Meeting   Meeting Type Daily Rounds   Team Members Present   Team Members Present Physician;Nurse   Physician Team Member Vidhi/Tony   Nursing Team Member Hebert   Patient/Family Present   Patient Present No   Patient's Family Present No       AM rounds- new admission.201. was discharged from nursing home for DUI charges. Pt was nto able to return to ex girlfriends house however did not want to live at recovery house. Pt made SI statement and was brought to ED. Pt currently denies SI. States he just did not want to be at recovery center. Pt reports this is first inpatient. Remains pleasant, calm and cooperative. Social with peers. Slept well.     Readmit score- 8

## 2024-06-01 NOTE — ASSESSMENT & PLAN NOTE
Hx of ETOH use (daily, unable to specify amount) over the past few months   Last drink > 1 week ago, currently out of withdrawal window  No hx of withdrawal/seizures  Encourage cessation   Vitamin supplementation

## 2024-06-01 NOTE — CMS CERTIFICATION NOTE
Recertification: Based upon physical, mental and social evaluations, I certify that inpatient psychiatric services continue to be medically necessary for this patient for a duration of 5 midnights for the treatment of  Recurrent major depressive disorder (HCC) Available alternative community resources still do not meet the patient's mental health care needs. I further attest that an established written individualized plan of care has been updated and is outlined in the patient's medical records.

## 2024-06-01 NOTE — NURSING NOTE
@ 6990 pt requested a PRN for anxiety, Mann 18. Pt received Atarax 50mg. Upon reassessment one hour later pt reported relief from Atarax. PRN effective.

## 2024-06-01 NOTE — NURSING NOTE
Patient arrived with the following:      At bedside:  1 pair of jeans  1 blue long-sleeve button-up shirt  1 pair black [employer]idas boxers  1 pair of socks      In storage:  1 pair boots  1 brown belt

## 2024-06-01 NOTE — NURSING NOTE
"Simba is a 29 y/o male, here on a 201 from [facility]. Patient was just released from [facility], where he served 3 days for DUI. Patient reports he has upcoming court date on June 7th. Simba was scheduled to begin treatment at [facility], but went to his ex-girlfriend's home instead. Ex-girlfriend then called [facility] staff, who went to the home. Upon arrival, Simba made SI statement, saying he would jump from a bridge and police were called. Patient reports that he just said the statement, but has no active SI or intent, endorses feeling of safety. Simba is calm and cooperative throughout admission assessment, reports this is his first inpatient hospitalization. Patient appears to minimize alcohol abuse, reports, \"I haven't had a drink in over a week and I've never had withdrawal or anything. My body handles it well and always has.\" Simba denies SI/HI/AVH, as well as PMH. Patient reports recent unintentional weight loss of approximately 5 pounds, also endorses difficulty sleeping. Simba reports history of being bullied as a child, otherwise denies abuse history. UDS: negative. Dr. Rylee aBltazar of [facility] notified via [employer] r/t completion of medication reconciliation.   "

## 2024-06-01 NOTE — NURSING NOTE
Patient was seen wandering the halls with an anxious affect. Offered a print out of Remeron medication information. Explained that it can help with insomnia and depression. Patient is hyperfocused about learning about the neurotransmitters involved in remeron. Denies SI, HI, AVH at this time. States that atarax 100 mg was effective earlier today. PRN previously was ineffective.

## 2024-06-01 NOTE — PLAN OF CARE
Problem: Ineffective Coping  Goal: Cooperates with admission process  Description: Interventions:   - Complete admission process  Outcome: Progressing  Goal: Identifies healthy coping skills  Outcome: Progressing     Problem: Anxiety  Goal: Anxiety is at manageable level  Description: Interventions:  - Assess and monitor patient's anxiety level.   - Monitor for signs and symptoms (heart palpitations, chest pain, shortness of breath, headaches, nausea, feeling jumpy, restlessness, irritable, apprehensive).   - Collaborate with interdisciplinary team and initiate plan and interventions as ordered.  - [place] patient to unit/surroundings  - Explain treatment plan  - Encourage participation in care  - Encourage verbalization of concerns/fears  - Identify coping mechanisms  - Assist in developing anxiety-reducing skills  - Administer/offer alternative therapies  - Limit or eliminate stimulants  Outcome: Progressing     Problem: SUBSTANCE USE/ABUSE  Goal: By discharge, patient will have ongoing treatment plan addressing chemical dependency  Description: INTERVENTIONS:  - Assist patient with resources and/or appointments for ongoing recovery based living  Outcome: Progressing

## 2024-06-02 LAB — TREPONEMA PALLIDUM IGG+IGM AB [PRESENCE] IN SERUM OR PLASMA BY IMMUNOASSAY: NORMAL

## 2024-06-02 PROCEDURE — 99232 SBSQ HOSP IP/OBS MODERATE 35: CPT | Performed by: STUDENT IN AN ORGANIZED HEALTH CARE EDUCATION/TRAINING PROGRAM

## 2024-06-02 RX ADMIN — Medication 100 MG: at 08:01

## 2024-06-02 RX ADMIN — MIRTAZAPINE 15 MG: 15 TABLET, FILM COATED ORAL at 21:31

## 2024-06-02 RX ADMIN — FOLIC ACID 1 MG: 1 TABLET ORAL at 08:01

## 2024-06-02 RX ADMIN — HYDROXYZINE HYDROCHLORIDE 50 MG: 50 TABLET, FILM COATED ORAL at 13:59

## 2024-06-02 RX ADMIN — MULTIPLE VITAMINS W/ MINERALS TAB 1 TABLET: TAB ORAL at 08:01

## 2024-06-02 RX ADMIN — HYDROXYZINE HYDROCHLORIDE 100 MG: 50 TABLET, FILM COATED ORAL at 07:57

## 2024-06-02 NOTE — TREATMENT TEAM
06/02/24 0800   Team Meeting   Meeting Type Daily Rounds   Team Members Present   Team Members Present Physician;Nurse   Physician Team Member Vidhi/Tony   Nursing Team Member Hebert   Patient/Family Present   Patient Present No   Patient's Family Present No       AM rounds- denies SI/HI, observed discussing Evangel[employer] with peers. Focused on scheduled remeron and wanting to learn the effects on patients neurotransmitters. -Reports from supervisor that patient has been calling ex Mother frequently.

## 2024-06-02 NOTE — NURSING NOTE
"At 1359 amy requested atarax. States, \"I only need a half dose. I am not as anxious now.\" Johnathan 18. Atarax 50mg PO given. On f/u he reports it was effective.   "

## 2024-06-02 NOTE — PLAN OF CARE
Problem: Anxiety  Goal: Anxiety is at manageable level  Description: Interventions:  - Assess and monitor patient's anxiety level.   - Monitor for signs and symptoms (heart palpitations, chest pain, shortness of breath, headaches, nausea, feeling jumpy, restlessness, irritable, apprehensive).   - Collaborate with interdisciplinary team and initiate plan and interventions as ordered.  - [place] patient to unit/surroundings  - Explain treatment plan  - Encourage participation in care  - Encourage verbalization of concerns/fears  - Identify coping mechanisms  - Assist in developing anxiety-reducing skills  - Administer/offer alternative therapies  - Limit or eliminate stimulants  Outcome: Progressing     Problem: SUBSTANCE USE/ABUSE  Goal: By discharge, patient will have ongoing treatment plan addressing chemical dependency  Description: INTERVENTIONS:  - Assist patient with resources and/or appointments for ongoing recovery based living  Outcome: Progressing

## 2024-06-02 NOTE — NURSING NOTE
"Received notice from supervisor that patients ex girlfriends Mother had contacted hospital stating that patient had been calling them frequently \"harrassing\" them and possibly will be contacting police about this. This reportedly occurred on first night patient was in hospital.   "

## 2024-06-02 NOTE — NURSING NOTE
"Simba is calm upon approach and pleasant in conversation. Patient is observed walking the halls and socializing with peers. Simba denies current SI/HI/AVH, reports, \"I feel really good. I'm thinking that the doctor will probably discharge me tomorrow. Everyone has been so nice to me here. I just think it's time for me to move on.\" Simba is compliant with prescribed medications and attends scheduled groups on the unit. Patient encouraged to seek staff with any questions and/or concerns, verbalized understanding.   "

## 2024-06-02 NOTE — NURSING NOTE
Pt is awake, alert, pleasant with staff. Pt shaved and completed ADLs this morning. Reports elevated anxiety, received PRN Atarax 100mg po at 0757.

## 2024-06-02 NOTE — PROGRESS NOTES
Progress Note - Behavioral Health   Simba Huang 28 y.o. male MRN: [id number]  Unit/Bed#: [facility] 214-02 Encounter: [id number]    Assessment & Plan   Principal Problem:    Recurrent major depressive disorder (HCC)  Active Problems:    Alcohol abuse    Medical clearance for psychiatric admission    Anxiety    Mood disorder (HCC)      Subjective: Patient was seen, chart was reviewed, and case was discussed with the team. As per report patient received PRN medications for anxiety. Patient appears calm, cooperative and anxious. Endorses depressed mood and hopelessness as he lost everything including relationships. How ever he is hopeful to start from the beginning by going to rehab. Sleep and appetite are ok. Patient is compliant with medications. Patient denied adverse effects to their current psychiatric medication regimen. Discussed the importance of continuing to take medications as prescribed, as well as the importance of continuing to attend groups on the unit.    Behavior over the last 24 hours:  improved  Sleep: normal  Appetite: normal  Medication side effects: No    Medical ROS: Pertinent items are noted in HPI.all other systems are negative    Current Medications:  Current Facility-Administered Medications   Medication Dose Route Frequency    acetaminophen (TYLENOL) tablet 650 mg  650 mg Oral Q6H PRN    acetaminophen (TYLENOL) tablet 650 mg  650 mg Oral Q4H PRN    acetaminophen (TYLENOL) tablet 975 mg  975 mg Oral Q6H PRN    aluminum-magnesium hydroxide-simethicone (MAALOX) oral suspension 30 mL  30 mL Oral Q4H PRN    artificial tear ophthalmic ointment   Both Eyes 4x Daily PRN    benztropine (COGENTIN) injection 1 mg  1 mg Intramuscular BID PRN    benztropine (COGENTIN) tablet 1 mg  1 mg Oral BID PRN    bisacodyl (DULCOLAX) rectal suppository 10 mg  10 mg Rectal Daily PRN    hydrOXYzine HCL (ATARAX) tablet 50 mg  50 mg Oral Q6H PRN Max 4/day    Or    diphenhydrAMINE (BENADRYL) injection 50 mg  50 mg  Intramuscular Q6H PRN    folic acid (FOLVITE) tablet 1 mg  1 mg Oral Daily    hydrOXYzine HCL (ATARAX) tablet 100 mg  100 mg Oral Q6H PRN Max 4/day    Or    LORazepam (ATIVAN) injection 2 mg  2 mg Intramuscular Q6H PRN    hydrOXYzine HCL (ATARAX) tablet 25 mg  25 mg Oral Q6H PRN Max 4/day    magnesium hydroxide (MILK OF MAGNESIA) oral suspension 30 mL  30 mL Oral Daily PRN    mirtazapine (REMERON) tablet 15 mg  15 mg Oral HS    multivitamin-minerals (CENTRUM) tablet 1 tablet  1 tablet Oral Daily    OLANZapine (ZyPREXA) tablet 10 mg  10 mg Oral Q3H PRN Max 3/day    Or    OLANZapine (ZyPREXA) IM injection 10 mg  10 mg Intramuscular Q3H PRN Max 3/day    OLANZapine (ZyPREXA) tablet 5 mg  5 mg Oral Q3H PRN Max 6/day    Or    OLANZapine (ZyPREXA) IM injection 5 mg  5 mg Intramuscular Q3H PRN Max 6/day    OLANZapine (ZyPREXA) tablet 2.5 mg  2.5 mg Oral Q3H PRN Max 8/day    propranolol (INDERAL) tablet 10 mg  10 mg Oral Q8H PRN    senna-docusate sodium (SENOKOT S) 8.6-50 mg per tablet 1 tablet  1 tablet Oral Daily PRN    thiamine tablet 100 mg  100 mg Oral Daily    traZODone (DESYREL) tablet 50 mg  50 mg Oral HS PRN       Behavioral Health Medications:   all current active meds have been reviewed.    Vitals:  Vitals:    06/02/24 0751   BP: 154/89   Pulse: 78   Resp: 18   Temp: (!) 97.2 °F (36.2 °C)   SpO2:        Laboratory results:    I have personally reviewed all pertinent laboratory/tests results.  Most Recent Labs:   Lab Results   Component Value Date    WBC 5.50 06/01/2024    RBC 5.21 06/01/2024    HGB 15.1 06/01/2024    HCT 47.1 06/01/2024     06/01/2024    RDW 13.5 06/01/2024    NEUTROABS 2.50 06/01/2024    SODIUM 139 06/01/2024    K 3.7 06/01/2024     06/01/2024    CO2 28 06/01/2024    BUN 12 06/01/2024    CREATININE 0.90 06/01/2024    GLUC 86 06/01/2024    GLUF 86 06/01/2024    CALCIUM 8.8 06/01/2024    AST 19 06/01/2024    ALT 19 06/01/2024    ALKPHOS 49 06/01/2024    TP 7.0 06/01/2024    ALB 4.2  06/01/2024    TBILI 0.59 06/01/2024    CHOLESTEROL 216 (H) 06/01/2024    HDL 79 06/01/2024    TRIG 64 06/01/2024    LDLCALC 124 (H) 06/01/2024    NONHDLC 137 06/01/2024    [id number] 2.698 06/01/2024    HGBA1C 5.1 06/01/2024     06/01/2024       Mental Status Evaluation:    Appearance:  age appropriate   Behavior:  cooperative   Speech:  normal pitch and normal volume   Mood:  anxious   Affect:  mood-congruent   Thought Process:  goal directed and logical   Thought Content:  normal   Perceptual Disturbances: None   Risk Potential: Suicidal Ideations none  Homicidal Ideations none  Potential for Aggression No   Sensorium:  person, place, and time/date   Memory:  recent and remote memory grossly intact   Consciousness:  alert and awake    Attention: attention span appeared shorter than expected for age   Insight:  fair   Judgment: fair   Gait/Station: normal gait/station   Motor Activity: no abnormal movements       Progress Toward Goals: Progressing. Patient is not at goal. They are not yet ready for discharge. The patient's condition currently requires active psychopharmacological medication management, interdisciplinary coordination with case management, and the utilization of adjunctive milieu and group therapy to augment psychopharmacological efficacy. The patient's risk of morbidity, and progression or decompensation of psychiatric disease, is higher without this current treatment.    Recommended Treatment: Continue with pharmacotherapy, group therapy, milieu therapy and occupational therapy.      Risks, benefits and possible side effects of Medications:   Risks, benefits, alternatives, and possible side effects of patient's psychiatric medications were discussed with patient.

## 2024-06-02 NOTE — NURSING NOTE
"Simba requested atarax at 0757. He states, \"I am always anxious.\" Atarax 100mg PO given. On f/u he is visible on the unit, social with peers and appears calm.   "

## 2024-06-02 NOTE — NURSING NOTE
Pleasant and cooperative,walking halls with peers, denies any issues at this time, will come to staff if feeling unsafe

## 2024-06-03 LAB
ATRIAL RATE: 70 BPM
P AXIS: 80 DEGREES
PR INTERVAL: 142 MS
QRS AXIS: 75 DEGREES
QRSD INTERVAL: 102 MS
QT INTERVAL: 412 MS
QTC INTERVAL: 444 MS
T WAVE AXIS: 54 DEGREES
VENTRICULAR RATE: 70 BPM

## 2024-06-03 PROCEDURE — 99232 SBSQ HOSP IP/OBS MODERATE 35: CPT | Performed by: STUDENT IN AN ORGANIZED HEALTH CARE EDUCATION/TRAINING PROGRAM

## 2024-06-03 PROCEDURE — 93010 ELECTROCARDIOGRAM REPORT: CPT | Performed by: INTERNAL MEDICINE

## 2024-06-03 RX ORDER — GABAPENTIN 100 MG/1
100 CAPSULE ORAL 2 TIMES DAILY
Status: DISCONTINUED | OUTPATIENT
Start: 2024-06-03 | End: 2024-06-04

## 2024-06-03 RX ADMIN — HYDROXYZINE HYDROCHLORIDE 100 MG: 50 TABLET, FILM COATED ORAL at 06:47

## 2024-06-03 RX ADMIN — NICOTINE POLACRILEX 2 MG: 4 GUM, CHEWING BUCCAL at 17:11

## 2024-06-03 RX ADMIN — GABAPENTIN 100 MG: 100 CAPSULE ORAL at 12:02

## 2024-06-03 RX ADMIN — Medication 100 MG: at 09:37

## 2024-06-03 RX ADMIN — HYDROXYZINE HYDROCHLORIDE 50 MG: 50 TABLET, FILM COATED ORAL at 12:42

## 2024-06-03 RX ADMIN — NICOTINE POLACRILEX 2 MG: 4 GUM, CHEWING BUCCAL at 21:44

## 2024-06-03 RX ADMIN — FOLIC ACID 1 MG: 1 TABLET ORAL at 09:37

## 2024-06-03 RX ADMIN — MIRTAZAPINE 15 MG: 15 TABLET, FILM COATED ORAL at 21:27

## 2024-06-03 RX ADMIN — MULTIPLE VITAMINS W/ MINERALS TAB 1 TABLET: TAB ORAL at 09:37

## 2024-06-03 RX ADMIN — GABAPENTIN 100 MG: 100 CAPSULE ORAL at 17:11

## 2024-06-03 NOTE — NURSING NOTE
"Pt pleasant during conversation. Pt feels improvement in mood. Reports ongoing anxiety and depression that \"comes and goes\" Pt reports anxiety is worse than the depression. Pt concerned of his high blood pressure. Writer luis vitals. 156/96. Pt reports panic attacks at times. Last one was this morning. Pt social with peers. Eating and sleeping good. Denies SI/HI/AVH. Denies unmet needs at this time.   "

## 2024-06-03 NOTE — NURSING NOTE
Upon follow up of Atarax 100 mg pt appears to be asleep in room. Respirations are even and unlabored. Medication effective,

## 2024-06-03 NOTE — CASE MANAGEMENT
CM contacted [facility], [facility], [phone], and left a voicemail message requesting a call back.    Patient stated that he was scheduled to begin rehabilitation services at this program.

## 2024-06-03 NOTE — PLAN OF CARE
Problem: DISCHARGE PLANNING  Goal: Discharge to home or other facility with appropriate resources  Description: INTERVENTIONS:  - Identify barriers to discharge w/patient and caregiver  - Arrange for needed discharge resources and transportation as appropriate  - Identify discharge learning needs (meds, wound care, etc.)  - Arrange for interpretive services to assist at discharge as needed  - Refer to Case Management Department for coordinating discharge planning if the patient needs post-hospital services based on physician/advanced practitioner order or complex needs related to functional status, cognitive ability, or social support system  Outcome: Progressing  - CM met with patient and went over TOSHIA's, Intake, and Treatment Plan.

## 2024-06-03 NOTE — NURSING NOTE
Awakened with c/o anxiety. Atarax 100 mg. Po PRN given for severe anxiety (H=25) at 0647. Effect pending.

## 2024-06-03 NOTE — PROGRESS NOTES
Reviewed Diagnosis of:  Principal Problem:    Recurrent major depressive disorder (HCC)  Active Problems:    Alcohol abuse    Medical clearance for psychiatric admission    Anxiety    Mood disorder (HCC)    Reviewed Short Term Goals of: decrease in depressive symptoms, decrease in anxiety symptoms, decrease in suicidal thoughts, decrease in self abusive behaviors, improvement in insight, improvement in self care, sleep improvement, improvement in appetite, mood stabilization         All parties in agreement.       06/03/24 1215   Team Meeting   Meeting Type Tx Team Meeting   Team Members Present   Team Members Present Physician;Nurse;;Other (Discipline and Name)   Physician Team Member Dr. Mosher   Nursing Team Member Rina   Care Management Team Member Sonu   Patient/Family Present   Patient Present No  (pt declined to participate)   Patient's Family Present No

## 2024-06-03 NOTE — PROGRESS NOTES
Progress Note - Behavioral Health   Simba Huang 28 y.o. male MRN: [id number]  Unit/Bed#: [facility] 214-02 Encounter: [id number]    Assessment & Plan   Principal Problem:    Recurrent major depressive disorder (HCC)  Active Problems:    Alcohol abuse    Medical clearance for psychiatric admission    Anxiety    Mood disorder (HCC)      Subjective: Patient was seen, chart was reviewed, and case was discussed with the team. As per report patient received PRN medications for anxiety. Patient endorses depression in the context of relationship issues. Anxiety is fluctuating, discussed various option Buspar vs propranolol vs Gabapentin. Benefits and side effects were discussed. He wants to try gabapentin. Patient is compliant with medications. Patient denied adverse effects to their current psychiatric medication regimen. Discussed the importance of continuing to take medications as prescribed, as well as the importance of continuing to attend groups on the unit.    Behavior over the last 24 hours:  improved  Sleep: normal  Appetite: normal  Medication side effects: No    Medical ROS: Pertinent items are noted in HPI.all other systems are negative    Current Medications:  Current Facility-Administered Medications   Medication Dose Route Frequency    acetaminophen (TYLENOL) tablet 650 mg  650 mg Oral Q6H PRN    acetaminophen (TYLENOL) tablet 650 mg  650 mg Oral Q4H PRN    acetaminophen (TYLENOL) tablet 975 mg  975 mg Oral Q6H PRN    aluminum-magnesium hydroxide-simethicone (MAALOX) oral suspension 30 mL  30 mL Oral Q4H PRN    artificial tear ophthalmic ointment   Both Eyes 4x Daily PRN    benztropine (COGENTIN) injection 1 mg  1 mg Intramuscular BID PRN    benztropine (COGENTIN) tablet 1 mg  1 mg Oral BID PRN    bisacodyl (DULCOLAX) rectal suppository 10 mg  10 mg Rectal Daily PRN    hydrOXYzine HCL (ATARAX) tablet 50 mg  50 mg Oral Q6H PRN Max 4/day    Or    diphenhydrAMINE (BENADRYL) injection 50 mg  50 mg Intramuscular Q6H  PRN    folic acid (FOLVITE) tablet 1 mg  1 mg Oral Daily    gabapentin (NEURONTIN) capsule 100 mg  100 mg Oral BID    hydrOXYzine HCL (ATARAX) tablet 100 mg  100 mg Oral Q6H PRN Max 4/day    Or    LORazepam (ATIVAN) injection 2 mg  2 mg Intramuscular Q6H PRN    hydrOXYzine HCL (ATARAX) tablet 25 mg  25 mg Oral Q6H PRN Max 4/day    magnesium hydroxide (MILK OF MAGNESIA) oral suspension 30 mL  30 mL Oral Daily PRN    mirtazapine (REMERON) tablet 15 mg  15 mg Oral HS    multivitamin-minerals (CENTRUM) tablet 1 tablet  1 tablet Oral Daily    OLANZapine (ZyPREXA) tablet 10 mg  10 mg Oral Q3H PRN Max 3/day    Or    OLANZapine (ZyPREXA) IM injection 10 mg  10 mg Intramuscular Q3H PRN Max 3/day    OLANZapine (ZyPREXA) tablet 5 mg  5 mg Oral Q3H PRN Max 6/day    Or    OLANZapine (ZyPREXA) IM injection 5 mg  5 mg Intramuscular Q3H PRN Max 6/day    OLANZapine (ZyPREXA) tablet 2.5 mg  2.5 mg Oral Q3H PRN Max 8/day    propranolol (INDERAL) tablet 10 mg  10 mg Oral Q8H PRN    senna-docusate sodium (SENOKOT S) 8.6-50 mg per tablet 1 tablet  1 tablet Oral Daily PRN    thiamine tablet 100 mg  100 mg Oral Daily    traZODone (DESYREL) tablet 50 mg  50 mg Oral HS PRN       Behavioral Health Medications:   all current active meds have been reviewed.    Vitals:  Vitals:    06/03/24 0730   BP: 150/80   Pulse: 75   Resp: 18   Temp: (!) 96.9 °F (36.1 °C)   SpO2: 100%       Laboratory results:    I have personally reviewed all pertinent laboratory/tests results.  Most Recent Labs:   Lab Results   Component Value Date    WBC 5.50 06/01/2024    RBC 5.21 06/01/2024    HGB 15.1 06/01/2024    HCT 47.1 06/01/2024     06/01/2024    RDW 13.5 06/01/2024    NEUTROABS 2.50 06/01/2024    SODIUM 139 06/01/2024    K 3.7 06/01/2024     06/01/2024    CO2 28 06/01/2024    BUN 12 06/01/2024    CREATININE 0.90 06/01/2024    GLUC 86 06/01/2024    GLUF 86 06/01/2024    CALCIUM 8.8 06/01/2024    AST 19 06/01/2024    ALT 19 06/01/2024    ALKPHOS 49  06/01/2024    TP 7.0 06/01/2024    ALB 4.2 06/01/2024    TBILI 0.59 06/01/2024    CHOLESTEROL 216 (H) 06/01/2024    HDL 79 06/01/2024    TRIG 64 06/01/2024    LDLCALC 124 (H) 06/01/2024    NONHDLC 137 06/01/2024    [id number] 2.698 06/01/2024    HGBA1C 5.1 06/01/2024     06/01/2024       Mental Status Evaluation:    Appearance:  age appropriate   Behavior:  cooperative   Speech:  normal pitch and normal volume   Mood:  anxious and depressed   Affect:  mood-congruent   Thought Process:  goal directed and logical   Thought Content:  normal   Perceptual Disturbances: None   Risk Potential: Suicidal Ideations none  Homicidal Ideations none  Potential for Aggression No   Sensorium:  person, place, and time/date   Memory:  recent and remote memory grossly intact   Consciousness:  alert and awake    Attention: attention span appeared shorter than expected for age   Insight:  fair   Judgment: fair   Gait/Station: normal gait/station   Motor Activity: no abnormal movements       Progress Toward Goals: Progressing. Patient is not at goal. They are not yet ready for discharge. The patient's condition currently requires active psychopharmacological medication management, interdisciplinary coordination with case management, and the utilization of adjunctive milieu and group therapy to augment psychopharmacological efficacy. The patient's risk of morbidity, and progression or decompensation of psychiatric disease, is higher without this current treatment.    Recommended Treatment: Continue with pharmacotherapy, group therapy, milieu therapy and occupational therapy.    1.Start Gabapentin 100 mg BID    Risks, benefits and possible side effects of Medications:   Risks, benefits, alternatives, and possible side effects of patient's psychiatric medications were discussed with patient.

## 2024-06-03 NOTE — CASE MANAGEMENT
CM spoke with patient's PO, who informed CM that patient needs to discharge to the court and complete paperwork before returning to [place].     CM has provided patient with this update. Patient was fine with discharging to the court at [address] [place] [address]. Patient's mother will  patient from there and drive to [place].

## 2024-06-03 NOTE — PLAN OF CARE
Problem: DISCHARGE PLANNING  Goal: Discharge to home or other facility with appropriate resources  Description: INTERVENTIONS:  - Identify barriers to discharge w/patient and caregiver  - Arrange for needed discharge resources and transportation as appropriate  - Identify discharge learning needs (meds, wound care, etc.)  - Arrange for interpretive services to assist at discharge as needed  - Refer to Case Management Department for coordinating discharge planning if the patient needs post-hospital services based on physician/advanced practitioner order or complex needs related to functional status, cognitive ability, or social support system  6/3/2024 0323 by Deya Amor RN  Outcome: Progressing  6/3/2024 0322 by Deya Amor RN  Outcome: Progressing     Problem: Ineffective Coping  Goal: Cooperates with admission process  Description: Interventions:   - Complete admission process  Outcome: Completed  Goal: Identifies healthy coping skills  6/3/2024 0323 by Deya Amor RN  Outcome: Progressing  6/3/2024 0322 by Deya Amor RN  Outcome: Progressing     Problem: Anxiety  Goal: Anxiety is at manageable level  Description: Interventions:  - Assess and monitor patient's anxiety level.   - Monitor for signs and symptoms (heart palpitations, chest pain, shortness of breath, headaches, nausea, feeling jumpy, restlessness, irritable, apprehensive).   - Collaborate with interdisciplinary team and initiate plan and interventions as ordered.  - [place] patient to unit/surroundings  - Explain treatment plan  - Encourage participation in care  - Encourage verbalization of concerns/fears  - Identify coping mechanisms  - Assist in developing anxiety-reducing skills  - Administer/offer alternative therapies  - Limit or eliminate stimulants  6/3/2024 0323 by Deya Amor RN  Outcome: Progressing  6/3/2024 0322 by Deya Amor RN  Outcome: Progressing     Problem: SUBSTANCE  USE/ABUSE  Goal: By discharge, patient will have ongoing treatment plan addressing chemical dependency  Description: INTERVENTIONS:  - Assist patient with resources and/or appointments for ongoing recovery based living  6/3/2024 0323 by Deya Amor, RN  Outcome: Progressing  6/3/2024 0322 by Deya Amor, RN  Outcome: Progressing

## 2024-06-03 NOTE — CASE MANAGEMENT
Intake    Admit Date/Time: 24 at 2024   : 1995  Simba Huang   Discharge Date: TBD     Treatment Plan:     Reviewed and signed     Confirmed Address:         [facility]:  [address] [place] [address] [address] [id number]      [place]    Resides in the home with:     Step-father, mother, brother    Will Return Home at Discharge:     To Rehab in [place]    Confirmed Phone Number:     [phone]     Marital Status:   Children:     Single    No children     Family History:             Parents:                         Siblings:   Dep./anxiety- mother         1 brother    Commitment Status:      Status Changes:   201      n/a    Admitted from:     [facility]     Presenting C/O:      Patient reported challenges and stressors with a previous relationship. Patient reported that he was drinking and did not make the best decision going to visit his ex. Patient presently wants to get his life on track.   As per ED Note:         This is a 28-year-old male brought in by EMS. As per the patient he states he made a threat to jump off a bridge because he was very upset while talking to a  from the rehab center he was supposed to go to. He states he had multiple stressors and has difficulty dealing with the stressors. He denies chest pain or trouble breathing. He denies nausea or vomiting.       Past Inpatient Tx:     1st time     Past Suicide Attempts:     1 previous attempt (choking)    Current outpatient:  Patient is going to rehab in [place] to deal with his alcoholism    Psychiatrist:     To be referred    Therapist:   To be referred     ACT/ICM/CPS/WRT/SC:     Declined     Med Hx/Concern:     High blood pressure     Medications:     Remeron 15 mg   Gabapentin 100 mg     Pharmacy:     [facility]    Spirituality/Yarsani:     Hinduism     Work/Income:      Education:       Preferred time for appts:  [facility]      HS Grad      Anytime     Legal:       Probation/[place] [facility]:  Freya Berry    [address], [place] [address]   [phone]       Access to Firearms:     no    Transportation:     Drives - however license is suspended    Strength:     Motivated to get well and get help     Goal:     To stabilize his health     Referrals Needed:        Rehab    Transport at Discharge:     To be arranged     IMM:  n/a    ROIs obtained:        DOUGLAS WATKINS- [phone]   [phone]    Insurance:                Emergency Contact:   Genesis Hernandez   Mother   [phone]           None listed     Audit: 21  PAWSS: n/a  BAT: 0.00  UDS:  Negative    Substance Use:  Freq.  Amount  Last Use  Notes:    Alcohol   Daily

## 2024-06-03 NOTE — NURSING NOTE
Pt with elevated anxiety and feeling as though he was having a panic attack this morning.  Pt states he is unsure what brought on these feelings and unable to provide specific trigger.  Declined offer of Propanolol.  Provider order Gabapentin 100mg BID which was reviewed with pt.  Pt tolerated first dose, continued to report elevated anxiety and received another dose of Atarax 50mg.  Pt is currently participating in group and social with peers.  Denies SI/HI/AVH.

## 2024-06-03 NOTE — PROGRESS NOTES
Patient is a 201 from [place]. Patient was taken via EMS to the ED, due to threats to jump off a bridge.     Patient reported multiple stressors while in the ED, and challenges managing those stressors.     UDS, PAWSS, AUDIT, and BAT reviewed.    Discharge Date: TBD       06/03/24 0750   Team Meeting   Meeting Type Daily Rounds   Team Members Present   Team Members Present Physician;Nurse;;Other (Discipline and Name)   Physician Team Member Dr. Grier/ Dr. Mosher/ PETE Craft   Nursing Team Member Tray/ Hebert   Care Management Team Member Arnold/ Fabian/ Sonu/ Abhishek   Other (Discipline and Name) Group Facilitator, Jazmin   Patient/Family Present   Patient Present No  (pt declined to participate)   Patient's Family Present No

## 2024-06-04 PROCEDURE — 99232 SBSQ HOSP IP/OBS MODERATE 35: CPT | Performed by: STUDENT IN AN ORGANIZED HEALTH CARE EDUCATION/TRAINING PROGRAM

## 2024-06-04 RX ORDER — MIRTAZAPINE 15 MG/1
30 TABLET, FILM COATED ORAL
Status: DISCONTINUED | OUTPATIENT
Start: 2024-06-04 | End: 2024-06-05

## 2024-06-04 RX ADMIN — NICOTINE POLACRILEX 2 MG: 4 GUM, CHEWING BUCCAL at 12:18

## 2024-06-04 RX ADMIN — HYDROXYZINE HYDROCHLORIDE 100 MG: 50 TABLET, FILM COATED ORAL at 08:06

## 2024-06-04 RX ADMIN — NICOTINE POLACRILEX 2 MG: 4 GUM, CHEWING BUCCAL at 19:20

## 2024-06-04 RX ADMIN — NICOTINE POLACRILEX 2 MG: 4 GUM, CHEWING BUCCAL at 09:05

## 2024-06-04 RX ADMIN — NICOTINE POLACRILEX 2 MG: 4 GUM, CHEWING BUCCAL at 21:35

## 2024-06-04 RX ADMIN — FOLIC ACID 1 MG: 1 TABLET ORAL at 08:06

## 2024-06-04 RX ADMIN — NICOTINE POLACRILEX 2 MG: 4 GUM, CHEWING BUCCAL at 15:44

## 2024-06-04 RX ADMIN — HYDROXYZINE HYDROCHLORIDE 50 MG: 50 TABLET, FILM COATED ORAL at 20:19

## 2024-06-04 RX ADMIN — MULTIPLE VITAMINS W/ MINERALS TAB 1 TABLET: TAB ORAL at 08:07

## 2024-06-04 RX ADMIN — HYDROXYZINE HYDROCHLORIDE 50 MG: 50 TABLET, FILM COATED ORAL at 14:18

## 2024-06-04 RX ADMIN — GABAPENTIN 100 MG: 100 CAPSULE ORAL at 08:06

## 2024-06-04 RX ADMIN — Medication 100 MG: at 08:06

## 2024-06-04 RX ADMIN — MIRTAZAPINE 30 MG: 15 TABLET, FILM COATED ORAL at 21:33

## 2024-06-04 NOTE — DISCHARGE INSTR - APPOINTMENTS
You are being discharged to [address] [place], [place] [address], [place] [address], to meet with your PO and complete necessary paperwork  You then will do to [facility] - [address] [place] [address]      You will be contacted at your confirmed phone number of [phone].      Sophie, or Radha, our Behavioral Health Nurse Navigators, will be calling you after your discharge, on the phone number that you provided.  They will be available as an additional support, if needed.   If you wish to speak with one of them, you may contact Sophie at [phone] or Radha at [phone].

## 2024-06-04 NOTE — PROGRESS NOTES
Completed relapse prevention plan with assistance from this writer. Identified warning signs, coping strategies, and supports. Discussed resources for relapse prevention and management.

## 2024-06-04 NOTE — NURSING NOTE
Patient is pleasant , patient denies SI HI AVH, has anxiety and some depression. Patient using atarax for anxiety. Patient plans to go to rehab at [facility] after discharge.

## 2024-06-04 NOTE — PLAN OF CARE
Problem: Ineffective Coping  Goal: Identifies healthy coping skills  Outcome: Progressing     Problem: Anxiety  Goal: Anxiety is at manageable level  Description: Interventions:  - Assess and monitor patient's anxiety level.   - Monitor for signs and symptoms (heart palpitations, chest pain, shortness of breath, headaches, nausea, feeling jumpy, restlessness, irritable, apprehensive).   - Collaborate with interdisciplinary team and initiate plan and interventions as ordered.  - [place] patient to unit/surroundings  - Explain treatment plan  - Encourage participation in care  - Encourage verbalization of concerns/fears  - Identify coping mechanisms  - Assist in developing anxiety-reducing skills  - Administer/offer alternative therapies  - Limit or eliminate stimulants  Outcome: Progressing     Problem: SUBSTANCE USE/ABUSE  Goal: By discharge, patient will have ongoing treatment plan addressing chemical dependency  Description: INTERVENTIONS:  - Assist patient with resources and/or appointments for ongoing recovery based living  Outcome: Progressing     Problem: Ineffective Coping  Goal: Participates in unit activities  Description: Interventions:  - Provide therapeutic environment   - Provide required programming   - Redirect inappropriate behaviors   Outcome: Progressing

## 2024-06-04 NOTE — NURSING NOTE
Patient received 100 mg atarax,  pappas score reflective of moderate, patient declined 50 mg. Patient reports effective

## 2024-06-04 NOTE — DISCHARGE INSTR - OTHER ORDERS
Mental Health Crisis Hotlines  Suicide Prevention Hotlines  For help now, call the National Suicide Prevention Lifeline: 7-397-081-TALK (7756) or the [facility]’s 24-Hours Suicide Intervention Hotline:  [facility], Call: [phone]  [facility], Call: [phone]  For help now, text the National Crisis Text Line: text HOME to 486713  Mental Health Crisis Lines      If you are experiencing a medical emergency, have taken an overdose, or have harmed yourself in any way,  DIAL 9-1-1 IMMEDIATELY     If you are having a psychiatric or substance abuse crisis and need assistance, please contact Mobile Crisis Intervention Services (MCIS):  [facility] Hotline, call: [phone]  [facility] Hotline, call: [phone]     [facility] ([facility]) [address] [place] [address].   Offers facilty based crisis services for adults experiencing mental health or substance abuse crisis.     Services are available 24 hours a day, 365 days a year [phone]     Mobile Crisis Intervention Services (MCIS), [facility] Mobile Crisis new address: [facility] at [address] [place] [address].  Staffed 24 hours a day, 7 days a week  Serves all [facility] in [facility].  Provides hotline support, mobile outreach and walk-in crisis services. [phone]      (TOLL FREE)     The National [employer] on Mental [employer]ness (SIOMARA) offers various education & support groups for you & your family.  For more information visit their website at    http://www.siomara-lv.org/.

## 2024-06-04 NOTE — NURSING NOTE
Pt received Atarax 50 mg PO at 1418 for moderate anxiety and pappas score of 19. Pt stated that the medication was not effective but did not want anymore medication.

## 2024-06-04 NOTE — NURSING NOTE
Pt and female peer have been very flirtatious were redirected from sharing blanket in the day room watching steve

## 2024-06-04 NOTE — PROGRESS NOTES
Patient reports improved mood. Patient reports ongoing depressive symptoms and anxiety.    Patient denies SI and HI/AH. Patient has been observed being social with peers.       Discharge Date: 6/6/24 06/04/24 0750   Team Meeting   Meeting Type Daily Rounds   Team Members Present   Team Members Present Physician;Nurse;;Other (Discipline and Name)   Physician Team Member Dr. Grier/ Dr. Mosher/ PETE Craft   Nursing Team Member Janes/ Cecelia/ Tray   Care Management Team Member Abhishek/ Sonu/ Fabian/ Arnold   Other (Discipline and Name) Group Facilitator, Jazmin/ Pharmacy Tech.   Patient/Family Present   Patient Present No   Patient's Family Present No

## 2024-06-04 NOTE — NURSING NOTE
Pt denies SI, HI, and AVH and denies thoughts of self-harm or harming others. Pt denies experiencing depression but reports some mild anxiety today. Pt is visible and social with peers. Pt does not have any questions or concerns about the medication regimen. Pt denies any other concerns at this time.

## 2024-06-05 PROCEDURE — 99232 SBSQ HOSP IP/OBS MODERATE 35: CPT | Performed by: STUDENT IN AN ORGANIZED HEALTH CARE EDUCATION/TRAINING PROGRAM

## 2024-06-05 RX ORDER — HYDROXYZINE 50 MG/1
100 TABLET, FILM COATED ORAL 2 TIMES DAILY
Status: DISCONTINUED | OUTPATIENT
Start: 2024-06-05 | End: 2024-06-07 | Stop reason: HOSPADM

## 2024-06-05 RX ORDER — MIRTAZAPINE 15 MG/1
15 TABLET, FILM COATED ORAL
Status: DISCONTINUED | OUTPATIENT
Start: 2024-06-05 | End: 2024-06-07 | Stop reason: HOSPADM

## 2024-06-05 RX ADMIN — MIRTAZAPINE 15 MG: 15 TABLET, FILM COATED ORAL at 21:27

## 2024-06-05 RX ADMIN — NICOTINE POLACRILEX 2 MG: 4 GUM, CHEWING BUCCAL at 14:45

## 2024-06-05 RX ADMIN — MULTIPLE VITAMINS W/ MINERALS TAB 1 TABLET: TAB ORAL at 08:23

## 2024-06-05 RX ADMIN — NICOTINE POLACRILEX 2 MG: 4 GUM, CHEWING BUCCAL at 09:08

## 2024-06-05 RX ADMIN — Medication 100 MG: at 08:23

## 2024-06-05 RX ADMIN — HYDROXYZINE HYDROCHLORIDE 100 MG: 50 TABLET, FILM COATED ORAL at 08:24

## 2024-06-05 RX ADMIN — NICOTINE POLACRILEX 2 MG: 4 GUM, CHEWING BUCCAL at 21:27

## 2024-06-05 RX ADMIN — FOLIC ACID 1 MG: 1 TABLET ORAL at 08:23

## 2024-06-05 RX ADMIN — PROPRANOLOL HYDROCHLORIDE 10 MG: 10 TABLET ORAL at 09:43

## 2024-06-05 RX ADMIN — HYDROXYZINE HYDROCHLORIDE 100 MG: 50 TABLET, FILM COATED ORAL at 14:11

## 2024-06-05 NOTE — NURSING NOTE
"Pt given PRN Atarax 100mg this morning for elevated anxiety.  On follow up, pt reports no relief.  Pt states he was told to try Propanolol and has taken this med before with good effect.  Pt given Propanolol 10mg at 0943.  Pt feels Remeron dose was increased too fast and causing him to feels anxious, \"foggy and not like myself.\"  Pt will discuss decreasing dose with provider. Reports restless sleep overnight and has not slept well since admission.   Denies SI/HI/AVH.    "

## 2024-06-05 NOTE — NURSING NOTE
Pt reports moderate relief of anxiety and restlessness after taking Propranolol.  Also reports discussing further with provider and is hopeful med changes will be helpful.

## 2024-06-05 NOTE — PLAN OF CARE
Problem: Ineffective Coping  Goal: Identifies healthy coping skills  Outcome: Progressing     Problem: Anxiety  Goal: Anxiety is at manageable level  Description: Interventions:  - Assess and monitor patient's anxiety level.   - Monitor for signs and symptoms (heart palpitations, chest pain, shortness of breath, headaches, nausea, feeling jumpy, restlessness, irritable, apprehensive).   - Collaborate with interdisciplinary team and initiate plan and interventions as ordered.  - [place] patient to unit/surroundings  - Explain treatment plan  - Encourage participation in care  - Encourage verbalization of concerns/fears  - Identify coping mechanisms  - Assist in developing anxiety-reducing skills  - Administer/offer alternative therapies  - Limit or eliminate stimulants  Outcome: Progressing     Problem: SUBSTANCE USE/ABUSE  Goal: By discharge, patient will have ongoing treatment plan addressing chemical dependency  Description: INTERVENTIONS:  - Assist patient with resources and/or appointments for ongoing recovery based living  Outcome: Progressing     Problem: Ineffective Coping  Goal: Participates in unit activities  Description: Interventions:  - Provide therapeutic environment   - Provide required programming   - Redirect inappropriate behaviors   Outcome: Progressing

## 2024-06-05 NOTE — NURSING NOTE
Pt received Atarax 50 mg PO at 2019 for moderate anxiety and pappas score of 22. Upon reassessment medication was effective.

## 2024-06-05 NOTE — PROGRESS NOTES
Progress Note - Behavioral Health   Simba Huang 28 y.o. male MRN: [id number]  Unit/Bed#: [facility] 214-02 Encounter: [id number]    Assessment & Plan   Principal Problem:    Recurrent major depressive disorder (HCC)  Active Problems:    Alcohol abuse    Medical clearance for psychiatric admission    Anxiety    Mood disorder (HCC)      Subjective: Patient was seen, chart was reviewed, and case was discussed with the team. As per report patient received PRN medications for anxiety. Patient reports that he is not feeling good today. He woke up with anxiety and restlessness. He feels that Remeron was increased to fats and wants to decrease the dose. Anxiety has been fluctuating. He got propranolol. Hydroxyzine was helpful. Sleep and appetite are ok. Patient is compliant with medications. Patient denied adverse effects to their current psychiatric medication regimen. Discussed the importance of continuing to take medications as prescribed, as well as the importance of continuing to attend groups on the unit.    Behavior over the last 24 hours:  improved  Sleep: normal  Appetite: normal  Medication side effects: No    Medical ROS: Pertinent items are noted in HPI.all other systems are negative    Current Medications:  Current Facility-Administered Medications   Medication Dose Route Frequency    acetaminophen (TYLENOL) tablet 650 mg  650 mg Oral Q6H PRN    acetaminophen (TYLENOL) tablet 650 mg  650 mg Oral Q4H PRN    acetaminophen (TYLENOL) tablet 975 mg  975 mg Oral Q6H PRN    aluminum-magnesium hydroxide-simethicone (MAALOX) oral suspension 30 mL  30 mL Oral Q4H PRN    artificial tear ophthalmic ointment   Both Eyes 4x Daily PRN    benztropine (COGENTIN) injection 1 mg  1 mg Intramuscular BID PRN    benztropine (COGENTIN) tablet 1 mg  1 mg Oral BID PRN    bisacodyl (DULCOLAX) rectal suppository 10 mg  10 mg Rectal Daily PRN    hydrOXYzine HCL (ATARAX) tablet 50 mg  50 mg Oral Q6H PRN Max 4/day    Or    diphenhydrAMINE  (BENADRYL) injection 50 mg  50 mg Intramuscular Q6H PRN    folic acid (FOLVITE) tablet 1 mg  1 mg Oral Daily    hydrOXYzine HCL (ATARAX) tablet 100 mg  100 mg Oral Q6H PRN Max 4/day    Or    LORazepam (ATIVAN) injection 2 mg  2 mg Intramuscular Q6H PRN    hydrOXYzine HCL (ATARAX) tablet 100 mg  100 mg Oral BID    hydrOXYzine HCL (ATARAX) tablet 25 mg  25 mg Oral Q6H PRN Max 4/day    magnesium hydroxide (MILK OF MAGNESIA) oral suspension 30 mL  30 mL Oral Daily PRN    mirtazapine (REMERON) tablet 15 mg  15 mg Oral HS    multivitamin-minerals (CENTRUM) tablet 1 tablet  1 tablet Oral Daily    nicotine polacrilex (NICORETTE) gum 2 mg  2 mg Oral Q2H PRN    OLANZapine (ZyPREXA) tablet 10 mg  10 mg Oral Q3H PRN Max 3/day    Or    OLANZapine (ZyPREXA) IM injection 10 mg  10 mg Intramuscular Q3H PRN Max 3/day    OLANZapine (ZyPREXA) tablet 5 mg  5 mg Oral Q3H PRN Max 6/day    Or    OLANZapine (ZyPREXA) IM injection 5 mg  5 mg Intramuscular Q3H PRN Max 6/day    OLANZapine (ZyPREXA) tablet 2.5 mg  2.5 mg Oral Q3H PRN Max 8/day    propranolol (INDERAL) tablet 10 mg  10 mg Oral Q8H PRN    senna-docusate sodium (SENOKOT S) 8.6-50 mg per tablet 1 tablet  1 tablet Oral Daily PRN    thiamine tablet 100 mg  100 mg Oral Daily    traZODone (DESYREL) tablet 50 mg  50 mg Oral HS PRN       Behavioral Health Medications:   all current active meds have been reviewed.    Vitals:  Vitals:    06/05/24 0714   BP: 140/76   Pulse: 67   Resp: 18   Temp: 97.5 °F (36.4 °C)   SpO2: 100%       Laboratory results:    I have personally reviewed all pertinent laboratory/tests results.  Most Recent Labs:   Lab Results   Component Value Date    WBC 5.50 06/01/2024    RBC 5.21 06/01/2024    HGB 15.1 06/01/2024    HCT 47.1 06/01/2024     06/01/2024    RDW 13.5 06/01/2024    NEUTROABS 2.50 06/01/2024    SODIUM 139 06/01/2024    K 3.7 06/01/2024     06/01/2024    CO2 28 06/01/2024    BUN 12 06/01/2024    CREATININE 0.90 06/01/2024    GLUC 86  06/01/2024    GLUF 86 06/01/2024    CALCIUM 8.8 06/01/2024    AST 19 06/01/2024    ALT 19 06/01/2024    ALKPHOS 49 06/01/2024    TP 7.0 06/01/2024    ALB 4.2 06/01/2024    TBILI 0.59 06/01/2024    CHOLESTEROL 216 (H) 06/01/2024    HDL 79 06/01/2024    TRIG 64 06/01/2024    LDLCALC 124 (H) 06/01/2024    NONHDLC 137 06/01/2024    XXV1UVIOJDLE 2.698 06/01/2024    HGBA1C 5.1 06/01/2024     06/01/2024       Mental Status Evaluation:    Appearance:  age appropriate   Behavior:  cooperative   Speech:  normal pitch and normal volume   Mood:  anxious   Affect:  mood-congruent   Thought Process:  goal directed and logical   Thought Content:  normal   Perceptual Disturbances: None   Risk Potential: Suicidal Ideations none  Homicidal Ideations none  Potential for Aggression No   Sensorium:  person, place, and time/date   Memory:  recent and remote memory grossly intact   Consciousness:  alert and awake    Attention: attention span appeared shorter than expected for age   Insight:  fair   Judgment: fair   Gait/Station: normal gait/station   Motor Activity: no abnormal movements       Progress Toward Goals: Progressing. Patient is not at goal. They are not yet ready for discharge. The patient's condition currently requires active psychopharmacological medication management, interdisciplinary coordination with case management, and the utilization of adjunctive milieu and group therapy to augment psychopharmacological efficacy. The patient's risk of morbidity, and progression or decompensation of psychiatric disease, is higher without this current treatment.    Recommended Treatment: Continue with pharmacotherapy, group therapy, milieu therapy and occupational therapy.    1.Decrease remeron to 15 mg PO HS  2.Hydroxyzine 100 mg BID    Risks, benefits and possible side effects of Medications:   Risks, benefits, alternatives, and possible side effects of patient's psychiatric medications were discussed with patient.

## 2024-06-06 LAB
HIV 1+2 AB+HIV1 P24 AG SERPL QL IA: NORMAL
HIV1 P24 AG SER QL: NORMAL
SARS-COV-2 RNA RESP QL NAA+PROBE: NEGATIVE

## 2024-06-06 PROCEDURE — 86704 HEP B CORE ANTIBODY TOTAL: CPT | Performed by: PHYSICIAN ASSISTANT

## 2024-06-06 PROCEDURE — 86480 TB TEST CELL IMMUN MEASURE: CPT | Performed by: PHYSICIAN ASSISTANT

## 2024-06-06 PROCEDURE — 84402 ASSAY OF FREE TESTOSTERONE: CPT | Performed by: PHYSICIAN ASSISTANT

## 2024-06-06 PROCEDURE — 86705 HEP B CORE ANTIBODY IGM: CPT | Performed by: PHYSICIAN ASSISTANT

## 2024-06-06 PROCEDURE — 86803 HEPATITIS C AB TEST: CPT | Performed by: PHYSICIAN ASSISTANT

## 2024-06-06 PROCEDURE — 87340 HEPATITIS B SURFACE AG IA: CPT | Performed by: PHYSICIAN ASSISTANT

## 2024-06-06 PROCEDURE — 87806 HIV AG W/HIV1&2 ANTB W/OPTIC: CPT | Performed by: PHYSICIAN ASSISTANT

## 2024-06-06 PROCEDURE — 87635 SARS-COV-2 COVID-19 AMP PRB: CPT | Performed by: STUDENT IN AN ORGANIZED HEALTH CARE EDUCATION/TRAINING PROGRAM

## 2024-06-06 PROCEDURE — 99232 SBSQ HOSP IP/OBS MODERATE 35: CPT | Performed by: STUDENT IN AN ORGANIZED HEALTH CARE EDUCATION/TRAINING PROGRAM

## 2024-06-06 PROCEDURE — 84403 ASSAY OF TOTAL TESTOSTERONE: CPT | Performed by: PHYSICIAN ASSISTANT

## 2024-06-06 RX ADMIN — Medication 100 MG: at 08:08

## 2024-06-06 RX ADMIN — NICOTINE POLACRILEX 2 MG: 4 GUM, CHEWING BUCCAL at 15:16

## 2024-06-06 RX ADMIN — NICOTINE POLACRILEX 2 MG: 4 GUM, CHEWING BUCCAL at 09:08

## 2024-06-06 RX ADMIN — MIRTAZAPINE 15 MG: 15 TABLET, FILM COATED ORAL at 21:22

## 2024-06-06 RX ADMIN — FOLIC ACID 1 MG: 1 TABLET ORAL at 08:06

## 2024-06-06 RX ADMIN — NICOTINE POLACRILEX 2 MG: 4 GUM, CHEWING BUCCAL at 20:07

## 2024-06-06 RX ADMIN — MULTIPLE VITAMINS W/ MINERALS TAB 1 TABLET: TAB ORAL at 08:06

## 2024-06-06 RX ADMIN — PROPRANOLOL HYDROCHLORIDE 10 MG: 10 TABLET ORAL at 09:08

## 2024-06-06 RX ADMIN — HYDROXYZINE HYDROCHLORIDE 100 MG: 50 TABLET, FILM COATED ORAL at 08:06

## 2024-06-06 RX ADMIN — NICOTINE POLACRILEX 2 MG: 4 GUM, CHEWING BUCCAL at 18:02

## 2024-06-06 RX ADMIN — HYDROXYZINE HYDROCHLORIDE 100 MG: 50 TABLET, FILM COATED ORAL at 13:57

## 2024-06-06 NOTE — CASE MANAGEMENT
CM met with pt to check in about dc tomorrow. Pt reported feeling improved and ready to go to IP rehab. Pt reported he will need a ride to the [facility] to sign paperwork for Probation and then he will have his mom or a friend take him directly to [facility] in [place].

## 2024-06-06 NOTE — PROGRESS NOTES
Progress Note - Behavioral Health   Simba Huang 28 y.o. male MRN: [id number]  Unit/Bed#: [facility] 214-02 Encounter: [id number]    Assessment & Plan   Principal Problem:    Recurrent major depressive disorder (HCC)  Active Problems:    Alcohol abuse    Medical clearance for psychiatric admission    Anxiety    Mood disorder (HCC)      Subjective: Patient was seen, chart was reviewed, and case was discussed with the team. Patient appears calm and cooperative. Reports improvement in mood. He feels that he is on right dose of Remeron now. Anxiety is fluctuating. Sleep is ok. Appetite is good. Denies any thoughts to hurt self or others. Patient is compliant with medications. Patient denied adverse effects to their current psychiatric medication regimen. Discussed the importance of continuing to take medications as prescribed, as well as the importance of continuing to attend groups on the unit.    Behavior over the last 24 hours:  improved  Sleep: normal  Appetite: normal  Medication side effects: No    Medical ROS: Pertinent items are noted in HPI.all other systems are negative    Current Medications:  Current Facility-Administered Medications   Medication Dose Route Frequency    acetaminophen (TYLENOL) tablet 650 mg  650 mg Oral Q6H PRN    acetaminophen (TYLENOL) tablet 650 mg  650 mg Oral Q4H PRN    acetaminophen (TYLENOL) tablet 975 mg  975 mg Oral Q6H PRN    aluminum-magnesium hydroxide-simethicone (MAALOX) oral suspension 30 mL  30 mL Oral Q4H PRN    artificial tear ophthalmic ointment   Both Eyes 4x Daily PRN    benztropine (COGENTIN) injection 1 mg  1 mg Intramuscular BID PRN    benztropine (COGENTIN) tablet 1 mg  1 mg Oral BID PRN    bisacodyl (DULCOLAX) rectal suppository 10 mg  10 mg Rectal Daily PRN    hydrOXYzine HCL (ATARAX) tablet 50 mg  50 mg Oral Q6H PRN Max 4/day    Or    diphenhydrAMINE (BENADRYL) injection 50 mg  50 mg Intramuscular Q6H PRN    folic acid (FOLVITE) tablet 1 mg  1 mg Oral Daily     hydrOXYzine HCL (ATARAX) tablet 100 mg  100 mg Oral Q6H PRN Max 4/day    Or    LORazepam (ATIVAN) injection 2 mg  2 mg Intramuscular Q6H PRN    hydrOXYzine HCL (ATARAX) tablet 100 mg  100 mg Oral BID    hydrOXYzine HCL (ATARAX) tablet 25 mg  25 mg Oral Q6H PRN Max 4/day    magnesium hydroxide (MILK OF MAGNESIA) oral suspension 30 mL  30 mL Oral Daily PRN    mirtazapine (REMERON) tablet 15 mg  15 mg Oral HS    multivitamin-minerals (CENTRUM) tablet 1 tablet  1 tablet Oral Daily    nicotine polacrilex (NICORETTE) gum 2 mg  2 mg Oral Q2H PRN    OLANZapine (ZyPREXA) tablet 10 mg  10 mg Oral Q3H PRN Max 3/day    Or    OLANZapine (ZyPREXA) IM injection 10 mg  10 mg Intramuscular Q3H PRN Max 3/day    OLANZapine (ZyPREXA) tablet 5 mg  5 mg Oral Q3H PRN Max 6/day    Or    OLANZapine (ZyPREXA) IM injection 5 mg  5 mg Intramuscular Q3H PRN Max 6/day    OLANZapine (ZyPREXA) tablet 2.5 mg  2.5 mg Oral Q3H PRN Max 8/day    propranolol (INDERAL) tablet 10 mg  10 mg Oral Q8H PRN    senna-docusate sodium (SENOKOT S) 8.6-50 mg per tablet 1 tablet  1 tablet Oral Daily PRN    thiamine tablet 100 mg  100 mg Oral Daily    traZODone (DESYREL) tablet 50 mg  50 mg Oral HS PRN       Behavioral Health Medications:   all current active meds have been reviewed.    Vitals:  Vitals:    06/06/24 0723   BP: 161/90   Pulse: 73   Resp: 18   Temp: 98.6 °F (37 °C)   SpO2:        Laboratory results:    I have personally reviewed all pertinent laboratory/tests results.  Most Recent Labs:   Lab Results   Component Value Date    WBC 5.50 06/01/2024    RBC 5.21 06/01/2024    HGB 15.1 06/01/2024    HCT 47.1 06/01/2024     06/01/2024    RDW 13.5 06/01/2024    NEUTROABS 2.50 06/01/2024    SODIUM 139 06/01/2024    K 3.7 06/01/2024     06/01/2024    CO2 28 06/01/2024    BUN 12 06/01/2024    CREATININE 0.90 06/01/2024    GLUC 86 06/01/2024    GLUF 86 06/01/2024    CALCIUM 8.8 06/01/2024    AST 19 06/01/2024    ALT 19 06/01/2024    ALKPHOS 49  06/01/2024    TP 7.0 06/01/2024    ALB 4.2 06/01/2024    TBILI 0.59 06/01/2024    CHOLESTEROL 216 (H) 06/01/2024    HDL 79 06/01/2024    TRIG 64 06/01/2024    LDLCALC 124 (H) 06/01/2024    NONHDLC 137 06/01/2024    CLE8XVAYCMDY 2.698 06/01/2024    HGBA1C 5.1 06/01/2024     06/01/2024       Mental Status Evaluation:    Appearance:  age appropriate   Behavior:  cooperative   Speech:  normal pitch and normal volume   Mood:  anxious   Affect:  mood-congruent   Thought Process:  goal directed and logical   Thought Content:  normal   Perceptual Disturbances: None   Risk Potential: Suicidal Ideations none  Homicidal Ideations none  Potential for Aggression No   Sensorium:  person, place, and time/date   Memory:  recent and remote memory grossly intact   Consciousness:  alert and awake    Attention: attention span appeared shorter than expected for age   Insight:  fair   Judgment: fair   Gait/Station: normal gait/station   Motor Activity: no abnormal movements       Progress Toward Goals: Progressing. Patient is not at goal. They are not yet ready for discharge. The patient's condition currently requires active psychopharmacological medication management, interdisciplinary coordination with case management, and the utilization of adjunctive milieu and group therapy to augment psychopharmacological efficacy. The patient's risk of morbidity, and progression or decompensation of psychiatric disease, is higher without this current treatment.    Recommended Treatment: Continue with pharmacotherapy, group therapy, milieu therapy and occupational therapy.    1.Discharge planning  2.Labs for rehab  Risks, benefits and possible side effects of Medications:   Risks, benefits, alternatives, and possible side effects of patient's psychiatric medications were discussed with patient.

## 2024-06-06 NOTE — CASE MANAGEMENT
JOSE FRANCISCO called [facility] ([phone]) and spoke intake director Artur and  also on the call.     Artur confirmed that pt has been accepted to their Rehab Program. JOSE FRANCISCO informed him that pt is cleared for dc Friday 6/7/24.     Artur reported required tests for pt to be admitted to their program:   -COVID test, HIV, TB, Hep A, B, C

## 2024-06-06 NOTE — NURSING NOTE
Pt requested prn propanolol for restlessness, given at 0908. Pt currently calm, sitting in group, prn appears to have been effective for restlessness/anxiety.

## 2024-06-06 NOTE — PLAN OF CARE
Problem: DISCHARGE PLANNING  Goal: Discharge to home or other facility with appropriate resources  Description: INTERVENTIONS:  - Identify barriers to discharge w/patient and caregiver  - Arrange for needed discharge resources and transportation as appropriate  - Identify discharge learning needs (meds, wound care, etc.)  - Arrange for interpretive services to assist at discharge as needed  - Refer to Case Management Department for coordinating discharge planning if the patient needs post-hospital services based on physician/advanced practitioner order or complex needs related to functional status, cognitive ability, or social support system  Outcome: Progressing     Problem: Ineffective Coping  Goal: Identifies healthy coping skills  Outcome: Progressing     Problem: Anxiety  Goal: Anxiety is at manageable level  Description: Interventions:  - Assess and monitor patient's anxiety level.   - Monitor for signs and symptoms (heart palpitations, chest pain, shortness of breath, headaches, nausea, feeling jumpy, restlessness, irritable, apprehensive).   - Collaborate with interdisciplinary team and initiate plan and interventions as ordered.  - [place] patient to unit/surroundings  - Explain treatment plan  - Encourage participation in care  - Encourage verbalization of concerns/fears  - Identify coping mechanisms  - Assist in developing anxiety-reducing skills  - Administer/offer alternative therapies  - Limit or eliminate stimulants  Outcome: Progressing     Problem: Ineffective Coping  Goal: Participates in unit activities  Description: Interventions:  - Provide therapeutic environment   - Provide required programming   - Redirect inappropriate behaviors   Outcome: Progressing     Problem: SUBSTANCE USE/ABUSE  Goal: By discharge, patient will have ongoing treatment plan addressing chemical dependency  Description: INTERVENTIONS:  - Assist patient with resources and/or appointments for ongoing recovery based  living  Outcome: Progressing

## 2024-06-06 NOTE — NURSING NOTE
Pt reported mild dizziness after obtaining blood work. Provided with fluids. Vitals WNL. Reports dizziness resolved. Denies SI/HI or hallucination. Reports going [facility] facility in [place] for 6 months. Hopeful for the future.

## 2024-06-07 VITALS
DIASTOLIC BLOOD PRESSURE: 95 MMHG | OXYGEN SATURATION: 99 % | TEMPERATURE: 98.6 F | HEIGHT: 75 IN | SYSTOLIC BLOOD PRESSURE: 166 MMHG | WEIGHT: 208.8 LBS | HEART RATE: 66 BPM | RESPIRATION RATE: 18 BRPM | BODY MASS INDEX: 25.96 KG/M2

## 2024-06-07 LAB
GAMMA INTERFERON BACKGROUND BLD IA-ACNC: 0.04 IU/ML
HBV CORE AB SER QL: NORMAL
HBV CORE IGM SER QL: NORMAL
HBV SURFACE AG SER QL: NORMAL
HCV AB SER QL: NORMAL
M TB IFN-G BLD-IMP: NEGATIVE
M TB IFN-G CD4+ BCKGRND COR BLD-ACNC: -0.01 IU/ML
M TB IFN-G CD4+ BCKGRND COR BLD-ACNC: -0.02 IU/ML
MITOGEN IGNF BCKGRD COR BLD-ACNC: 9.96 IU/ML
TESTOST FREE SERPL-MCNC: 11.7 PG/ML (ref 9.3–26.5)
TESTOST SERPL-MCNC: 693 NG/DL (ref 264–916)

## 2024-06-07 PROCEDURE — 99239 HOSP IP/OBS DSCHRG MGMT >30: CPT | Performed by: STUDENT IN AN ORGANIZED HEALTH CARE EDUCATION/TRAINING PROGRAM

## 2024-06-07 RX ORDER — LANOLIN ALCOHOL/MO/W.PET/CERES
100 CREAM (GRAM) TOPICAL DAILY
Qty: 30 TABLET | Refills: 1 | Status: SHIPPED | OUTPATIENT
Start: 2024-06-07

## 2024-06-07 RX ORDER — PROPRANOLOL HYDROCHLORIDE 10 MG/1
10 TABLET ORAL EVERY 8 HOURS PRN
Qty: 30 TABLET | Refills: 0 | Status: SHIPPED | OUTPATIENT
Start: 2024-06-07

## 2024-06-07 RX ORDER — MIRTAZAPINE 15 MG/1
15 TABLET, FILM COATED ORAL
Qty: 30 TABLET | Refills: 1 | Status: SHIPPED | OUTPATIENT
Start: 2024-06-07

## 2024-06-07 RX ORDER — HYDROXYZINE 50 MG/1
100 TABLET, FILM COATED ORAL 2 TIMES DAILY
Qty: 120 TABLET | Refills: 1 | Status: SHIPPED | OUTPATIENT
Start: 2024-06-07

## 2024-06-07 RX ORDER — FOLIC ACID 1 MG/1
1 TABLET ORAL DAILY
Qty: 30 TABLET | Refills: 1 | Status: SHIPPED | OUTPATIENT
Start: 2024-06-07

## 2024-06-07 RX ADMIN — Medication 100 MG: at 08:14

## 2024-06-07 RX ADMIN — PROPRANOLOL HYDROCHLORIDE 10 MG: 10 TABLET ORAL at 06:03

## 2024-06-07 RX ADMIN — NICOTINE POLACRILEX 2 MG: 4 GUM, CHEWING BUCCAL at 07:29

## 2024-06-07 RX ADMIN — FOLIC ACID 1 MG: 1 TABLET ORAL at 08:14

## 2024-06-07 RX ADMIN — HYDROXYZINE HYDROCHLORIDE 50 MG: 50 TABLET, FILM COATED ORAL at 08:14

## 2024-06-07 RX ADMIN — MULTIPLE VITAMINS W/ MINERALS TAB 1 TABLET: TAB ORAL at 08:14

## 2024-06-07 NOTE — PLAN OF CARE
Problem: Ineffective Coping  Goal: Identifies healthy coping skills  Outcome: Progressing     Problem: Anxiety  Goal: Anxiety is at manageable level  Description: Interventions:  - Assess and monitor patient's anxiety level.   - Monitor for signs and symptoms (heart palpitations, chest pain, shortness of breath, headaches, nausea, feeling jumpy, restlessness, irritable, apprehensive).   - Collaborate with interdisciplinary team and initiate plan and interventions as ordered.  - [place] patient to unit/surroundings  - Explain treatment plan  - Encourage participation in care  - Encourage verbalization of concerns/fears  - Identify coping mechanisms  - Assist in developing anxiety-reducing skills  - Administer/offer alternative therapies  - Limit or eliminate stimulants  Outcome: Progressing     Problem: SUBSTANCE USE/ABUSE  Goal: By discharge, patient will have ongoing treatment plan addressing chemical dependency  Description: INTERVENTIONS:  - Assist patient with resources and/or appointments for ongoing recovery based living  Outcome: Progressing     Problem: Ineffective Coping  Goal: Participates in unit activities  Description: Interventions:  - Provide therapeutic environment   - Provide required programming   - Redirect inappropriate behaviors   Outcome: Progressing

## 2024-06-07 NOTE — NURSING NOTE
Pt cooperative and appropriate on approach.  Expresses some anxiety r/t next steps but states he has been in the program at [facility] so he knows what to expect.  Denies SI or thoughts of self harm.  Pt is hopeful he will be permitted to take medications while attending the program.
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
## 2024-06-07 NOTE — CASE MANAGEMENT
6/7/24 1:15 pm    JOSE FRANCISCO attempted to fax pt's dc summary, medication list and lab results to [facility] but having issues with fax machine and it would not go through.     JOSE FRANCISCO called Artur ([phone]) at intake to inform him of this and asked for a call back if he can provide an email address for JOSE FRANCISCO to send information to.

## 2024-06-07 NOTE — CASE MANAGEMENT
CM scheduled transportation via [employer] for pt's dc today to [facility] office to sign paperwork ([address], [place] [address]) Pt will then go right to [facility] for IP rehab. ([address] [place] [address])     CM sent pt's Psych Eval, Medication List, and Lab tests that they requested to [facility] for review. Attn: Artur. (Fax# [phone])

## 2024-06-07 NOTE — NURSING NOTE
AVS reviewed and prescriptions have been e-prescribed.  Pt expresses understanding.  Denies any additional questions or concerns.     Pt discharged from unit via [employer].

## 2024-06-07 NOTE — BH TRANSITION RECORD
Contact Information: If you have any questions, concerns, pended studies, tests and/or procedures, or emergencies regarding your inpatient behavioral health visit. Please contact [place] [facility] [phone] and ask to speak to a , nurse or physician. A contact is available 24 hours/ 7 days a week at this number.     Summary of Procedures Performed During your Stay:  Below is a list of major procedures performed during your hospital stay and a summary of results:  - No major procedures performed.    Pending Studies (From admission, onward)       Start     Ordered    06/06/24 1232  Testosterone, free, total  Once         06/06/24 1231    06/06/24 1229  Quantiferon TB Gold Plus Gray  PROCEDURE ONCE         06/06/24 1358    06/06/24 1229  Quantiferon TB Gold Plus Green  PROCEDURE ONCE         06/06/24 1358    06/06/24 1229  Quantiferon TB Gold Plus Yellow  PROCEDURE ONCE         06/06/24 1358    06/06/24 1229  Quantiferon TB Gold Plus Purple  PROCEDURE ONCE         06/06/24 1358    06/06/24 1228  Chronic Hepatitis Panel  Once         06/06/24 1228                  Please follow up on the above pending studies with your PCP and/or referring provider.

## 2024-06-10 NOTE — CASE MANAGEMENT
6/10/24 2:30pm   CM received call from pt and he reported he is at [facility] for Re[facility] and reported that he needs a letter for the dates he was hospitalized for [facility].     Pt provided email address to send letter to.   [email or web address]    CM also sent pt's labs and clinical information that they requested as the fax did not go through.     Letter and documents sent per pt request.

## 2024-09-20 NOTE — PROGRESS NOTES
Daroff-Stephenson exercises for vertigo.    Continue Aimovig once a month    Continue as needed sumatriptan at onset of headache    Keep a headache diary    Will see you back in 3 months but please let me know if you have increased headache frequency prior to that and we will plan on increasing the dose of Aimovig   Progress Note - Behavioral Health   Simba Huang 28 y.o. male MRN: [id number]  Unit/Bed#: [facility] 214-02 Encounter: [id number]    Assessment & Plan   Principal Problem:    Recurrent major depressive disorder (HCC)  Active Problems:    Alcohol abuse    Medical clearance for psychiatric admission    Anxiety    Mood disorder (HCC)      Subjective: Patient was seen, chart was reviewed, and case was discussed with the team. As per report patient received PRN medication for anxiety. Patient reports improvement in mood but anxiety is fluctuating. Sleep was disturbed. Denies any thoughts to hurt self or others. He feels that gabapentin was not helpful. He wants to continue with hydroxyzine.Patient is compliant with medications. Patient denied adverse effects to their current psychiatric medication regimen. Discussed the importance of continuing to take medications as prescribed, as well as the importance of continuing to attend groups on the unit.    Behavior over the last 24 hours:  improved  Sleep: normal  Appetite: normal  Medication side effects: No    Medical ROS: Pertinent items are noted in HPI.all other systems are negative    Current Medications:  Current Facility-Administered Medications   Medication Dose Route Frequency    acetaminophen (TYLENOL) tablet 650 mg  650 mg Oral Q6H PRN    acetaminophen (TYLENOL) tablet 650 mg  650 mg Oral Q4H PRN    acetaminophen (TYLENOL) tablet 975 mg  975 mg Oral Q6H PRN    aluminum-magnesium hydroxide-simethicone (MAALOX) oral suspension 30 mL  30 mL Oral Q4H PRN    artificial tear ophthalmic ointment   Both Eyes 4x Daily PRN    benztropine (COGENTIN) injection 1 mg  1 mg Intramuscular BID PRN    benztropine (COGENTIN) tablet 1 mg  1 mg Oral BID PRN    bisacodyl (DULCOLAX) rectal suppository 10 mg  10 mg Rectal Daily PRN    hydrOXYzine HCL (ATARAX) tablet 50 mg  50 mg Oral Q6H PRN Max 4/day    Or    diphenhydrAMINE (BENADRYL) injection 50 mg  50 mg Intramuscular Q6H PRN    folic  acid (FOLVITE) tablet 1 mg  1 mg Oral Daily    hydrOXYzine HCL (ATARAX) tablet 100 mg  100 mg Oral Q6H PRN Max 4/day    Or    LORazepam (ATIVAN) injection 2 mg  2 mg Intramuscular Q6H PRN    hydrOXYzine HCL (ATARAX) tablet 25 mg  25 mg Oral Q6H PRN Max 4/day    magnesium hydroxide (MILK OF MAGNESIA) oral suspension 30 mL  30 mL Oral Daily PRN    mirtazapine (REMERON) tablet 30 mg  30 mg Oral HS    multivitamin-minerals (CENTRUM) tablet 1 tablet  1 tablet Oral Daily    nicotine polacrilex (NICORETTE) gum 2 mg  2 mg Oral Q2H PRN    OLANZapine (ZyPREXA) tablet 10 mg  10 mg Oral Q3H PRN Max 3/day    Or    OLANZapine (ZyPREXA) IM injection 10 mg  10 mg Intramuscular Q3H PRN Max 3/day    OLANZapine (ZyPREXA) tablet 5 mg  5 mg Oral Q3H PRN Max 6/day    Or    OLANZapine (ZyPREXA) IM injection 5 mg  5 mg Intramuscular Q3H PRN Max 6/day    OLANZapine (ZyPREXA) tablet 2.5 mg  2.5 mg Oral Q3H PRN Max 8/day    propranolol (INDERAL) tablet 10 mg  10 mg Oral Q8H PRN    senna-docusate sodium (SENOKOT S) 8.6-50 mg per tablet 1 tablet  1 tablet Oral Daily PRN    thiamine tablet 100 mg  100 mg Oral Daily    traZODone (DESYREL) tablet 50 mg  50 mg Oral HS PRN       Behavioral Health Medications:   all current active meds have been reviewed.    Vitals:  Vitals:    06/04/24 0731   BP: 127/83   Pulse: 97   Resp: 17   Temp: 98.3 °F (36.8 °C)   SpO2:        Laboratory results:    I have personally reviewed all pertinent laboratory/tests results.  Most Recent Labs:   Lab Results   Component Value Date    WBC 5.50 06/01/2024    RBC 5.21 06/01/2024    HGB 15.1 06/01/2024    HCT 47.1 06/01/2024     06/01/2024    RDW 13.5 06/01/2024    NEUTROABS 2.50 06/01/2024    SODIUM 139 06/01/2024    K 3.7 06/01/2024     06/01/2024    CO2 28 06/01/2024    BUN 12 06/01/2024    CREATININE 0.90 06/01/2024    GLUC 86 06/01/2024    GLUF 86 06/01/2024    CALCIUM 8.8 06/01/2024    AST 19 06/01/2024    ALT 19 06/01/2024    ALKPHOS 49 06/01/2024    TP  7.0 06/01/2024    ALB 4.2 06/01/2024    TBILI 0.59 06/01/2024    CHOLESTEROL 216 (H) 06/01/2024    HDL 79 06/01/2024    TRIG 64 06/01/2024    LDLCALC 124 (H) 06/01/2024    NONHDLC 137 06/01/2024    DDQ1XXDHBORQ 2.698 06/01/2024    HGBA1C 5.1 06/01/2024     06/01/2024       Mental Status Evaluation:    Appearance:  age appropriate   Behavior:  cooperative   Speech:  normal pitch and normal volume   Mood:  anxious   Affect:  mood-congruent   Thought Process:  goal directed and logical   Thought Content:  normal   Perceptual Disturbances: None   Risk Potential: Suicidal Ideations none  Homicidal Ideations none  Potential for Aggression No   Sensorium:  person, place, and time/date   Memory:  recent and remote memory grossly intact   Consciousness:  alert and awake    Attention: attention span appeared shorter than expected for age   Insight:  fair   Judgment: fair   Gait/Station: normal gait/station   Motor Activity: no abnormal movements       Progress Toward Goals: Progressing. Patient is not at goal. They are not yet ready for discharge. The patient's condition currently requires active psychopharmacological medication management, interdisciplinary coordination with case management, and the utilization of adjunctive milieu and group therapy to augment psychopharmacological efficacy. The patient's risk of morbidity, and progression or decompensation of psychiatric disease, is higher without this current treatment.    Recommended Treatment: Continue with pharmacotherapy, group therapy, milieu therapy and occupational therapy.    1.Discontinue Gabapentin  Risks, benefits and possible side effects of Medications:   Risks, benefits, alternatives, and possible side effects of patient's psychiatric medications were discussed with patient.